# Patient Record
Sex: FEMALE | Race: ASIAN | NOT HISPANIC OR LATINO | Employment: UNEMPLOYED | URBAN - METROPOLITAN AREA
[De-identification: names, ages, dates, MRNs, and addresses within clinical notes are randomized per-mention and may not be internally consistent; named-entity substitution may affect disease eponyms.]

---

## 2017-04-04 ENCOUNTER — HOSPITAL ENCOUNTER (OUTPATIENT)
Dept: LAB | Facility: MEDICAL CENTER | Age: 32
End: 2017-04-04
Attending: OBSTETRICS & GYNECOLOGY
Payer: COMMERCIAL

## 2017-04-04 PROCEDURE — 87491 CHLMYD TRACH DNA AMP PROBE: CPT

## 2017-04-04 PROCEDURE — 87591 N.GONORRHOEAE DNA AMP PROB: CPT

## 2017-04-04 PROCEDURE — 88175 CYTOPATH C/V AUTO FLUID REDO: CPT

## 2017-04-06 LAB
C TRACH DNA GENITAL QL NAA+PROBE: NEGATIVE
CYTOLOGY REG CYTOL: NORMAL
N GONORRHOEA DNA GENITAL QL NAA+PROBE: NEGATIVE
SPECIMEN SOURCE: NORMAL

## 2017-04-20 ENCOUNTER — HOSPITAL ENCOUNTER (OUTPATIENT)
Dept: LAB | Facility: MEDICAL CENTER | Age: 32
End: 2017-04-20
Attending: OBSTETRICS & GYNECOLOGY
Payer: COMMERCIAL

## 2017-04-20 LAB
ABO GROUP BLD: NORMAL
APPEARANCE UR: ABNORMAL
BACTERIA #/AREA URNS HPF: ABNORMAL /HPF
BASOPHILS # BLD AUTO: 0.7 % (ref 0–1.8)
BASOPHILS # BLD: 0.04 K/UL (ref 0–0.12)
BILIRUB UR QL STRIP.AUTO: NEGATIVE
BLD GP AB SCN SERPL QL: NORMAL
CAOX CRY #/AREA URNS HPF: ABNORMAL /HPF
COLOR UR: YELLOW
CULTURE IF INDICATED INDCX: YES UA CULTURE
EOSINOPHIL # BLD AUTO: 0.03 K/UL (ref 0–0.51)
EOSINOPHIL NFR BLD: 0.5 % (ref 0–6.9)
EPI CELLS #/AREA URNS HPF: ABNORMAL /HPF
ERYTHROCYTE [DISTWIDTH] IN BLOOD BY AUTOMATED COUNT: 44.6 FL (ref 35.9–50)
GLUCOSE UR STRIP.AUTO-MCNC: NEGATIVE MG/DL
HBV SURFACE AG SER QL: NEGATIVE
HCT VFR BLD AUTO: 33.8 % (ref 37–47)
HCV AB SER QL: NEGATIVE
HGB BLD-MCNC: 11.6 G/DL (ref 12–16)
HIV 1+2 AB+HIV1 P24 AG SERPL QL IA: NON REACTIVE
IMM GRANULOCYTES # BLD AUTO: 0.01 K/UL (ref 0–0.11)
IMM GRANULOCYTES NFR BLD AUTO: 0.2 % (ref 0–0.9)
KETONES UR STRIP.AUTO-MCNC: NEGATIVE MG/DL
LEUKOCYTE ESTERASE UR QL STRIP.AUTO: ABNORMAL
LYMPHOCYTES # BLD AUTO: 1.04 K/UL (ref 1–4.8)
LYMPHOCYTES NFR BLD: 18.2 % (ref 22–41)
MCH RBC QN AUTO: 31.5 PG (ref 27–33)
MCHC RBC AUTO-ENTMCNC: 34.3 G/DL (ref 33.6–35)
MCV RBC AUTO: 91.8 FL (ref 81.4–97.8)
MICRO URNS: ABNORMAL
MONOCYTES # BLD AUTO: 0.21 K/UL (ref 0–0.85)
MONOCYTES NFR BLD AUTO: 3.7 % (ref 0–13.4)
MUCOUS THREADS #/AREA URNS HPF: ABNORMAL /HPF
NEUTROPHILS # BLD AUTO: 4.4 K/UL (ref 2–7.15)
NEUTROPHILS NFR BLD: 76.7 % (ref 44–72)
NITRITE UR QL STRIP.AUTO: NEGATIVE
NRBC # BLD AUTO: 0 K/UL
NRBC BLD AUTO-RTO: 0 /100 WBC
PH UR STRIP.AUTO: 6.5 [PH]
PLATELET # BLD AUTO: 286 K/UL (ref 164–446)
PMV BLD AUTO: 9.8 FL (ref 9–12.9)
PROT UR QL STRIP: 30 MG/DL
RBC # BLD AUTO: 3.68 M/UL (ref 4.2–5.4)
RBC # URNS HPF: ABNORMAL /HPF
RBC UR QL AUTO: NEGATIVE
RH BLD: NORMAL
RUBV AB SER QL: 108.8 IU/ML
SP GR UR STRIP.AUTO: 1.03
TREPONEMA PALLIDUM IGG+IGM AB [PRESENCE] IN SERUM OR PLASMA BY IMMUNOASSAY: NON REACTIVE
WBC # BLD AUTO: 5.7 K/UL (ref 4.8–10.8)
WBC #/AREA URNS HPF: ABNORMAL /HPF

## 2017-04-20 PROCEDURE — 81511 FTL CGEN ABNOR FOUR ANAL: CPT

## 2017-04-20 PROCEDURE — 86901 BLOOD TYPING SEROLOGIC RH(D): CPT

## 2017-04-20 PROCEDURE — 87340 HEPATITIS B SURFACE AG IA: CPT

## 2017-04-20 PROCEDURE — 86803 HEPATITIS C AB TEST: CPT

## 2017-04-20 PROCEDURE — 87086 URINE CULTURE/COLONY COUNT: CPT

## 2017-04-20 PROCEDURE — 86900 BLOOD TYPING SEROLOGIC ABO: CPT

## 2017-04-20 PROCEDURE — 85025 COMPLETE CBC W/AUTO DIFF WBC: CPT

## 2017-04-20 PROCEDURE — 86762 RUBELLA ANTIBODY: CPT

## 2017-04-20 PROCEDURE — 86780 TREPONEMA PALLIDUM: CPT

## 2017-04-20 PROCEDURE — 86850 RBC ANTIBODY SCREEN: CPT

## 2017-04-20 PROCEDURE — 87389 HIV-1 AG W/HIV-1&-2 AB AG IA: CPT

## 2017-04-20 PROCEDURE — 81001 URINALYSIS AUTO W/SCOPE: CPT

## 2017-04-22 LAB
BACTERIA UR CULT: NORMAL
SIGNIFICANT IND 70042: NORMAL
SOURCE SOURCE: NORMAL

## 2017-04-27 LAB
# FETUSES US: ABNORMAL
AFP MOM SERPL: 0.93
AFP SERPL-MCNC: 28 NG/ML
AGE - REPORTED: 33 YR
GA METHOD: ABNORMAL
GA: 15.14 WEEKS
HCG MOM SERPL: 1.6
HCG SERPL-ACNC: ABNORMAL IU/L
IDDM PATIENT QL: NO
INHIBIN A MOM SERPL: 1.16
INHIBIN A SERPL-MCNC: 225 PG/ML
INTEGRATED SCN PATIENT-IMP: ABNORMAL
PATHOLOGY STUDY: ABNORMAL
U ESTRIOL MOM SERPL: 0.7
U ESTRIOL SERPL-MCNC: 0.49 NG/ML

## 2017-07-05 ENCOUNTER — HOSPITAL ENCOUNTER (OUTPATIENT)
Dept: LAB | Facility: MEDICAL CENTER | Age: 32
End: 2017-07-05
Attending: OBSTETRICS & GYNECOLOGY
Payer: COMMERCIAL

## 2017-07-05 LAB
GLUCOSE 1H P 50 G GLC PO SERPL-MCNC: 91 MG/DL (ref 70–139)
HCT VFR BLD AUTO: 33.6 % (ref 37–47)
HGB BLD-MCNC: 11.4 G/DL (ref 12–16)
PLATELET # BLD AUTO: 311 K/UL (ref 164–446)
TREPONEMA PALLIDUM IGG+IGM AB [PRESENCE] IN SERUM OR PLASMA BY IMMUNOASSAY: NON REACTIVE

## 2017-07-05 PROCEDURE — 36415 COLL VENOUS BLD VENIPUNCTURE: CPT

## 2017-07-05 PROCEDURE — 82950 GLUCOSE TEST: CPT

## 2017-07-05 PROCEDURE — 85049 AUTOMATED PLATELET COUNT: CPT

## 2017-07-05 PROCEDURE — 86780 TREPONEMA PALLIDUM: CPT

## 2017-07-05 PROCEDURE — 85018 HEMOGLOBIN: CPT

## 2017-07-05 PROCEDURE — 85014 HEMATOCRIT: CPT

## 2017-08-30 ENCOUNTER — HOSPITAL ENCOUNTER (OUTPATIENT)
Dept: LAB | Facility: MEDICAL CENTER | Age: 32
End: 2017-08-30
Attending: OBSTETRICS & GYNECOLOGY
Payer: COMMERCIAL

## 2017-08-30 PROCEDURE — 87653 STREP B DNA AMP PROBE: CPT

## 2017-09-01 LAB — GP B STREP DNA SPEC QL NAA+PROBE: POSITIVE

## 2017-09-21 ENCOUNTER — HOSPITAL ENCOUNTER (OUTPATIENT)
Dept: LAB | Facility: MEDICAL CENTER | Age: 32
End: 2017-09-21
Attending: OBSTETRICS & GYNECOLOGY
Payer: COMMERCIAL

## 2017-09-21 PROCEDURE — 87081 CULTURE SCREEN ONLY: CPT

## 2017-09-21 PROCEDURE — 87880 STREP A ASSAY W/OPTIC: CPT

## 2017-09-24 LAB
S PYO SPEC QL CULT: NORMAL
SIGNIFICANT IND 70042: NORMAL
SITE SITE: NORMAL
SOURCE SOURCE: NORMAL

## 2017-10-14 ENCOUNTER — HOSPITAL ENCOUNTER (INPATIENT)
Facility: MEDICAL CENTER | Age: 32
LOS: 2 days | End: 2017-10-16
Attending: OBSTETRICS & GYNECOLOGY | Admitting: OBSTETRICS & GYNECOLOGY
Payer: COMMERCIAL

## 2017-10-14 LAB
ALBUMIN SERPL BCP-MCNC: 3.7 G/DL (ref 3.2–4.9)
ALBUMIN/GLOB SERPL: 1.1 G/DL
ALP SERPL-CCNC: 170 U/L (ref 30–99)
ALT SERPL-CCNC: 14 U/L (ref 2–50)
ANION GAP SERPL CALC-SCNC: 14 MMOL/L (ref 0–11.9)
AST SERPL-CCNC: 21 U/L (ref 12–45)
BASOPHILS # BLD AUTO: 0.6 % (ref 0–1.8)
BASOPHILS # BLD: 0.05 K/UL (ref 0–0.12)
BILIRUB SERPL-MCNC: 0.8 MG/DL (ref 0.1–1.5)
BUN SERPL-MCNC: 9 MG/DL (ref 8–22)
CALCIUM SERPL-MCNC: 9.6 MG/DL (ref 8.5–10.5)
CHLORIDE SERPL-SCNC: 106 MMOL/L (ref 96–112)
CO2 SERPL-SCNC: 19 MMOL/L (ref 20–33)
CREAT SERPL-MCNC: 0.51 MG/DL (ref 0.5–1.4)
EOSINOPHIL # BLD AUTO: 0.03 K/UL (ref 0–0.51)
EOSINOPHIL NFR BLD: 0.4 % (ref 0–6.9)
ERYTHROCYTE [DISTWIDTH] IN BLOOD BY AUTOMATED COUNT: 43.3 FL (ref 35.9–50)
ERYTHROCYTE [DISTWIDTH] IN BLOOD BY AUTOMATED COUNT: 43.4 FL (ref 35.9–50)
GFR SERPL CREATININE-BSD FRML MDRD: >60 ML/MIN/1.73 M 2
GLOBULIN SER CALC-MCNC: 3.3 G/DL (ref 1.9–3.5)
GLUCOSE SERPL-MCNC: 111 MG/DL (ref 65–99)
HCT VFR BLD AUTO: 30.9 % (ref 37–47)
HCT VFR BLD AUTO: 37.5 % (ref 37–47)
HGB BLD-MCNC: 10.7 G/DL (ref 12–16)
HGB BLD-MCNC: 13 G/DL (ref 12–16)
HOLDING TUBE BB 8507: NORMAL
IMM GRANULOCYTES # BLD AUTO: 0.1 K/UL (ref 0–0.11)
IMM GRANULOCYTES NFR BLD AUTO: 1.2 % (ref 0–0.9)
LYMPHOCYTES # BLD AUTO: 1.35 K/UL (ref 1–4.8)
LYMPHOCYTES NFR BLD: 15.8 % (ref 22–41)
MCH RBC QN AUTO: 31.4 PG (ref 27–33)
MCH RBC QN AUTO: 31.7 PG (ref 27–33)
MCHC RBC AUTO-ENTMCNC: 34.6 G/DL (ref 33.6–35)
MCHC RBC AUTO-ENTMCNC: 34.7 G/DL (ref 33.6–35)
MCV RBC AUTO: 90.6 FL (ref 81.4–97.8)
MCV RBC AUTO: 91.4 FL (ref 81.4–97.8)
MONOCYTES # BLD AUTO: 0.39 K/UL (ref 0–0.85)
MONOCYTES NFR BLD AUTO: 4.6 % (ref 0–13.4)
NEUTROPHILS # BLD AUTO: 6.61 K/UL (ref 2–7.15)
NEUTROPHILS NFR BLD: 77.4 % (ref 44–72)
NRBC # BLD AUTO: 0 K/UL
NRBC BLD AUTO-RTO: 0 /100 WBC
PLATELET # BLD AUTO: 244 K/UL (ref 164–446)
PLATELET # BLD AUTO: 283 K/UL (ref 164–446)
PMV BLD AUTO: 9.3 FL (ref 9–12.9)
PMV BLD AUTO: 9.5 FL (ref 9–12.9)
POTASSIUM SERPL-SCNC: 3.7 MMOL/L (ref 3.6–5.5)
PROT SERPL-MCNC: 7 G/DL (ref 6–8.2)
RBC # BLD AUTO: 3.38 M/UL (ref 4.2–5.4)
RBC # BLD AUTO: 4.14 M/UL (ref 4.2–5.4)
SODIUM SERPL-SCNC: 139 MMOL/L (ref 135–145)
URATE SERPL-MCNC: 5.6 MG/DL (ref 1.9–8.2)
WBC # BLD AUTO: 11.2 K/UL (ref 4.8–10.8)
WBC # BLD AUTO: 8.5 K/UL (ref 4.8–10.8)

## 2017-10-14 PROCEDURE — 10907ZC DRAINAGE OF AMNIOTIC FLUID, THERAPEUTIC FROM PRODUCTS OF CONCEPTION, VIA NATURAL OR ARTIFICIAL OPENING: ICD-10-PCS | Performed by: OBSTETRICS & GYNECOLOGY

## 2017-10-14 PROCEDURE — 770002 HCHG ROOM/CARE - OB PRIVATE (112)

## 2017-10-14 PROCEDURE — 303615 HCHG EPIDURAL/SPINAL ANESTHESIA FOR LABOR

## 2017-10-14 PROCEDURE — 700105 HCHG RX REV CODE 258: Performed by: OBSTETRICS & GYNECOLOGY

## 2017-10-14 PROCEDURE — 4A1HXCZ MONITORING OF PRODUCTS OF CONCEPTION, CARDIAC RATE, EXTERNAL APPROACH: ICD-10-PCS | Performed by: OBSTETRICS & GYNECOLOGY

## 2017-10-14 PROCEDURE — 700111 HCHG RX REV CODE 636 W/ 250 OVERRIDE (IP)

## 2017-10-14 PROCEDURE — 700105 HCHG RX REV CODE 258

## 2017-10-14 PROCEDURE — 85025 COMPLETE CBC W/AUTO DIFF WBC: CPT

## 2017-10-14 PROCEDURE — 84550 ASSAY OF BLOOD/URIC ACID: CPT

## 2017-10-14 PROCEDURE — 0HQ9XZZ REPAIR PERINEUM SKIN, EXTERNAL APPROACH: ICD-10-PCS | Performed by: OBSTETRICS & GYNECOLOGY

## 2017-10-14 PROCEDURE — A9270 NON-COVERED ITEM OR SERVICE: HCPCS | Performed by: OBSTETRICS & GYNECOLOGY

## 2017-10-14 PROCEDURE — 304965 HCHG RECOVERY SERVICES

## 2017-10-14 PROCEDURE — 700111 HCHG RX REV CODE 636 W/ 250 OVERRIDE (IP): Performed by: OBSTETRICS & GYNECOLOGY

## 2017-10-14 PROCEDURE — 36415 COLL VENOUS BLD VENIPUNCTURE: CPT

## 2017-10-14 PROCEDURE — 85027 COMPLETE CBC AUTOMATED: CPT

## 2017-10-14 PROCEDURE — 80053 COMPREHEN METABOLIC PANEL: CPT

## 2017-10-14 PROCEDURE — 700102 HCHG RX REV CODE 250 W/ 637 OVERRIDE(OP): Performed by: OBSTETRICS & GYNECOLOGY

## 2017-10-14 PROCEDURE — 59409 OBSTETRICAL CARE: CPT

## 2017-10-14 RX ORDER — AMPICILLIN 2 G/1
2 INJECTION, POWDER, FOR SOLUTION INTRAVENOUS ONCE
Status: COMPLETED | OUTPATIENT
Start: 2017-10-14 | End: 2017-10-14

## 2017-10-14 RX ORDER — BUPIVACAINE HYDROCHLORIDE 2.5 MG/ML
INJECTION, SOLUTION EPIDURAL; INFILTRATION; INTRACAUDAL
Status: DISCONTINUED
Start: 2017-10-14 | End: 2017-10-14

## 2017-10-14 RX ORDER — ALUMINA, MAGNESIA, AND SIMETHICONE 2400; 2400; 240 MG/30ML; MG/30ML; MG/30ML
30 SUSPENSION ORAL EVERY 6 HOURS PRN
Status: DISCONTINUED | OUTPATIENT
Start: 2017-10-14 | End: 2017-10-14 | Stop reason: HOSPADM

## 2017-10-14 RX ORDER — DOCUSATE SODIUM 100 MG/1
100 CAPSULE, LIQUID FILLED ORAL 2 TIMES DAILY PRN
Status: DISCONTINUED | OUTPATIENT
Start: 2017-10-14 | End: 2017-10-16 | Stop reason: HOSPADM

## 2017-10-14 RX ORDER — CARBOPROST TROMETHAMINE 250 UG/ML
250 INJECTION, SOLUTION INTRAMUSCULAR
Status: DISCONTINUED | OUTPATIENT
Start: 2017-10-14 | End: 2017-10-16 | Stop reason: HOSPADM

## 2017-10-14 RX ORDER — ROPIVACAINE HYDROCHLORIDE 2 MG/ML
INJECTION, SOLUTION EPIDURAL; INFILTRATION; PERINEURAL
Status: COMPLETED
Start: 2017-10-14 | End: 2017-10-14

## 2017-10-14 RX ORDER — OXYCODONE AND ACETAMINOPHEN 10; 325 MG/1; MG/1
1 TABLET ORAL EVERY 4 HOURS PRN
Status: DISCONTINUED | OUTPATIENT
Start: 2017-10-14 | End: 2017-10-16 | Stop reason: HOSPADM

## 2017-10-14 RX ORDER — OXYCODONE HYDROCHLORIDE AND ACETAMINOPHEN 5; 325 MG/1; MG/1
1 TABLET ORAL EVERY 4 HOURS PRN
Status: DISCONTINUED | OUTPATIENT
Start: 2017-10-14 | End: 2017-10-16 | Stop reason: HOSPADM

## 2017-10-14 RX ORDER — VITAMIN A ACETATE, BETA CAROTENE, ASCORBIC ACID, CHOLECALCIFEROL, .ALPHA.-TOCOPHEROL ACETATE, DL-, THIAMINE MONONITRATE, RIBOFLAVIN, NIACINAMIDE, PYRIDOXINE HYDROCHLORIDE, FOLIC ACID, CYANOCOBALAMIN, CALCIUM CARBONATE, FERROUS FUMARATE, ZINC OXIDE, CUPRIC OXIDE 3080; 12; 120; 400; 1; 1.84; 3; 20; 22; 920; 25; 200; 27; 10; 2 [IU]/1; UG/1; MG/1; [IU]/1; MG/1; MG/1; MG/1; MG/1; MG/1; [IU]/1; MG/1; MG/1; MG/1; MG/1; MG/1
1 TABLET, FILM COATED ORAL EVERY MORNING
Status: DISCONTINUED | OUTPATIENT
Start: 2017-10-14 | End: 2017-10-16 | Stop reason: HOSPADM

## 2017-10-14 RX ORDER — DEXTROSE, SODIUM CHLORIDE, SODIUM LACTATE, POTASSIUM CHLORIDE, AND CALCIUM CHLORIDE 5; .6; .31; .03; .02 G/100ML; G/100ML; G/100ML; G/100ML; G/100ML
INJECTION, SOLUTION INTRAVENOUS CONTINUOUS
Status: DISCONTINUED | OUTPATIENT
Start: 2017-10-14 | End: 2017-10-14 | Stop reason: HOSPADM

## 2017-10-14 RX ORDER — SODIUM CHLORIDE, SODIUM LACTATE, POTASSIUM CHLORIDE, CALCIUM CHLORIDE 600; 310; 30; 20 MG/100ML; MG/100ML; MG/100ML; MG/100ML
INJECTION, SOLUTION INTRAVENOUS
Status: COMPLETED
Start: 2017-10-14 | End: 2017-10-14

## 2017-10-14 RX ORDER — HYDROCODONE BITARTRATE AND ACETAMINOPHEN 5; 325 MG/1; MG/1
1-2 TABLET ORAL EVERY 6 HOURS PRN
Status: DISCONTINUED | OUTPATIENT
Start: 2017-10-14 | End: 2017-10-16 | Stop reason: HOSPADM

## 2017-10-14 RX ORDER — MISOPROSTOL 200 UG/1
800 TABLET ORAL
Status: DISCONTINUED | OUTPATIENT
Start: 2017-10-14 | End: 2017-10-16 | Stop reason: HOSPADM

## 2017-10-14 RX ORDER — IBUPROFEN 600 MG/1
600 TABLET ORAL EVERY 6 HOURS PRN
Status: DISCONTINUED | OUTPATIENT
Start: 2017-10-14 | End: 2017-10-14

## 2017-10-14 RX ORDER — IBUPROFEN 600 MG/1
600 TABLET ORAL EVERY 6 HOURS PRN
Status: DISCONTINUED | OUTPATIENT
Start: 2017-10-14 | End: 2017-10-16 | Stop reason: HOSPADM

## 2017-10-14 RX ORDER — MISOPROSTOL 200 UG/1
1000 TABLET ORAL
Status: DISCONTINUED | OUTPATIENT
Start: 2017-10-14 | End: 2017-10-14 | Stop reason: HOSPADM

## 2017-10-14 RX ORDER — SODIUM CHLORIDE, SODIUM LACTATE, POTASSIUM CHLORIDE, CALCIUM CHLORIDE 600; 310; 30; 20 MG/100ML; MG/100ML; MG/100ML; MG/100ML
INJECTION, SOLUTION INTRAVENOUS PRN
Status: DISCONTINUED | OUTPATIENT
Start: 2017-10-14 | End: 2017-10-16 | Stop reason: HOSPADM

## 2017-10-14 RX ORDER — SODIUM CHLORIDE 9 MG/ML
INJECTION, SOLUTION INTRAVENOUS
Status: COMPLETED
Start: 2017-10-14 | End: 2017-10-14

## 2017-10-14 RX ORDER — METHYLERGONOVINE MALEATE 0.2 MG/ML
0.2 INJECTION INTRAVENOUS
Status: DISCONTINUED | OUTPATIENT
Start: 2017-10-14 | End: 2017-10-16 | Stop reason: HOSPADM

## 2017-10-14 RX ORDER — ONDANSETRON 4 MG/1
4 TABLET, ORALLY DISINTEGRATING ORAL EVERY 6 HOURS PRN
Status: DISCONTINUED | OUTPATIENT
Start: 2017-10-14 | End: 2017-10-16 | Stop reason: HOSPADM

## 2017-10-14 RX ORDER — AMPICILLIN 1 G/1
1 INJECTION, POWDER, FOR SOLUTION INTRAMUSCULAR; INTRAVENOUS EVERY 4 HOURS
Status: DISCONTINUED | OUTPATIENT
Start: 2017-10-14 | End: 2017-10-14 | Stop reason: HOSPADM

## 2017-10-14 RX ORDER — SODIUM CHLORIDE, SODIUM LACTATE, POTASSIUM CHLORIDE, CALCIUM CHLORIDE 600; 310; 30; 20 MG/100ML; MG/100ML; MG/100ML; MG/100ML
INJECTION, SOLUTION INTRAVENOUS CONTINUOUS
Status: DISCONTINUED | OUTPATIENT
Start: 2017-10-14 | End: 2017-10-14

## 2017-10-14 RX ORDER — ONDANSETRON 2 MG/ML
4 INJECTION INTRAMUSCULAR; INTRAVENOUS EVERY 6 HOURS PRN
Status: DISCONTINUED | OUTPATIENT
Start: 2017-10-14 | End: 2017-10-16 | Stop reason: HOSPADM

## 2017-10-14 RX ADMIN — SODIUM CHLORIDE: 900 INJECTION INTRAVENOUS at 09:30

## 2017-10-14 RX ADMIN — OXYTOCIN 20 UNITS: 10 INJECTION, SOLUTION INTRAMUSCULAR; INTRAVENOUS at 13:00

## 2017-10-14 RX ADMIN — SODIUM CHLORIDE, POTASSIUM CHLORIDE, SODIUM LACTATE AND CALCIUM CHLORIDE: 600; 310; 30; 20 INJECTION, SOLUTION INTRAVENOUS at 06:08

## 2017-10-14 RX ADMIN — IBUPROFEN 600 MG: 600 TABLET, FILM COATED ORAL at 17:13

## 2017-10-14 RX ADMIN — IBUPROFEN 600 MG: 600 TABLET, FILM COATED ORAL at 23:30

## 2017-10-14 RX ADMIN — AMPICILLIN SODIUM 1 G: 1 INJECTION, POWDER, FOR SOLUTION INTRAMUSCULAR; INTRAVENOUS at 09:29

## 2017-10-14 RX ADMIN — AMPICILLIN SODIUM 2 G: 2 INJECTION, POWDER, FOR SOLUTION INTRAMUSCULAR; INTRAVENOUS at 06:05

## 2017-10-14 RX ADMIN — OXYTOCIN 20 UNITS: 10 INJECTION, SOLUTION INTRAMUSCULAR; INTRAVENOUS at 12:00

## 2017-10-14 RX ADMIN — SODIUM CHLORIDE, POTASSIUM CHLORIDE, SODIUM LACTATE AND CALCIUM CHLORIDE: 600; 310; 30; 20 INJECTION, SOLUTION INTRAVENOUS at 11:12

## 2017-10-14 RX ADMIN — ROPIVACAINE HYDROCHLORIDE 100 ML: 2 INJECTION, SOLUTION EPIDURAL; INFILTRATION at 07:04

## 2017-10-14 ASSESSMENT — PATIENT HEALTH QUESTIONNAIRE - PHQ9
SUM OF ALL RESPONSES TO PHQ9 QUESTIONS 1 AND 2: 0
2. FEELING DOWN, DEPRESSED, IRRITABLE, OR HOPELESS: NOT AT ALL
1. LITTLE INTEREST OR PLEASURE IN DOING THINGS: NOT AT ALL
SUM OF ALL RESPONSES TO PHQ QUESTIONS 1-9: 0

## 2017-10-14 ASSESSMENT — COPD QUESTIONNAIRES
HAVE YOU SMOKED AT LEAST 100 CIGARETTES IN YOUR ENTIRE LIFE: NO/DON'T KNOW
COPD SCREENING SCORE: 0
DURING THE PAST 4 WEEKS HOW MUCH DID YOU FEEL SHORT OF BREATH: NONE/LITTLE OF THE TIME
DO YOU EVER COUGH UP ANY MUCUS OR PHLEGM?: NO/ONLY WITH OCCASIONAL COLDS OR INFECTIONS

## 2017-10-14 ASSESSMENT — LIFESTYLE VARIABLES
EVER_SMOKED: NEVER
DO YOU DRINK ALCOHOL: NO
ALCOHOL_USE: NO

## 2017-10-14 ASSESSMENT — PAIN SCALES - GENERAL
PAINLEVEL_OUTOF10: 1
PAINLEVEL_OUTOF10: 2
PAINLEVEL_OUTOF10: 0
PAINLEVEL_OUTOF10: 1
PAINLEVEL_OUTOF10: 1

## 2017-10-14 NOTE — PROGRESS NOTES
1300- report received from CHARLA Fisher RN, accepted care of pt. Pt denies pain at this time. Call light in reach. RN stressed importance of calling for help when getting OOB. Pt verbalized understanding.   1430- Pt up to bathroom with x1 person assist, void large amount without difficulty. Pt felt faint before ambulating to wheelchair. Educated pt to call for assistance when getting OOB. Pt transferred to  in stable condition with all her personal possessions.   1500- report to CATALINO Osorio RN. Baby bands verified.

## 2017-10-14 NOTE — PROGRESS NOTES
0530- pt presents to L&D with c/o contractions. Pt states she has been having contractions since 0300 today. Pt reports good fetal movement, denies vaginal bleeding and leaking of fluid. SVE by RN 6/80-1. Report to Dr Shah, orders received for admission. Pt requesting epidural for pain management.  1314- Dr Cr in room to place epidural. Pt tolerated procedure well, no complications with test dose.

## 2017-10-14 NOTE — CARE PLAN
Problem: Infection  Goal: Will remain free from infection  Outcome: PROGRESSING AS EXPECTED  Pt remains free of signs/symptoms of infection at this time, will continue to assess    Problem: Pain  Goal: Alleviation of Pain or a reduction in pain to the patient's comfort goal  Outcome: PROGRESSING AS EXPECTED  Pt states pain is well controlled with epidural, will continue to assess pain level

## 2017-10-14 NOTE — PROGRESS NOTES
1000- Dr Lewis at bedside. SVE- lip/100/0. AROM  By MD- thick meconium.  1140- pt complete/+2. Pt set up for delivery, begins pushing.  1150- Dr Lewis at bedside  1157-  of viable female infant. 8&9 apgar scores assigned

## 2017-10-14 NOTE — DELIVERY NOTE
DATE OF SERVICE:  10/14/2017    DELIVERY NOTE:  This is a 32-year-old  2, para 1, at 40-3/7 weeks, who   presented to labor and delivery this morning with contractions.  She was 6 cm   dilated and was admitted to labor and delivery.  She received an epidural for   pain management.  She was known to be group B strep positive and was started   on ampicillin.  She received 2 doses of ampicillin prior to artificial rupture   of membranes, which yielded thick meconium-stained fluid.  AROM occurred when   the patient was approximately 9 cm dilated.  Throughout labor, fetal heart   tones were in the 140s and a category 1 tracing.  The patient progressed to   complete and pushed over 4 contractions to deliver the head over an intact   perineum.  Loose nuchal cord x1 was easily reduced and a compound presentation   with the left arm was encountered, but delivered easily.  This was a viable   female infant, weight 6 pounds 10 ounces, Apgars 8 and 9, delivered at 1157   hours.  The cord was cut immediately and the infant was handed off to the   waiting respiratory therapist.  Unfortunately, she was already crying   vigorously, so there was no ability to suction below the vocal cords; however,   there was some bulb suction and suction of the abdomen was a minimal amount   of meconium-stained fluid.  The placenta delivered spontaneously intact with a   3-vessel cord at 1205 hours, 20 units of IV Pitocin, fundal massage and   bimanual massage provided good uterine contraction.  Estimated blood loss was   200 mL.  Inspection of the cervix revealed no lacerations.  Inspection of the   perineum revealed a small first-degree laceration.  This was repaired with 2-0   Vicryl in the usual sterile fashion.  Mother and infant are stable.       ____________________________________     MD SANDY DE PAZ / JESU    DD:  10/14/2017 12:20:41  DT:  10/14/2017 12:45:38    D#:  [id number]  Job#:  [id number]    cc: GUANACO SLATER MD

## 2017-10-14 NOTE — PROGRESS NOTES
Patient arrived via wheelchair with belongings to S339. Report received from Aracelis MCLEOD RN. Pt oriented to room, call light/skylight & infant security. Assessment done fundus firm, lochia light, and vital signs within defined parameters. IV patent and infusing pitocin at 125 mL/Hr per MD order. Reviewed plan of care with patient, patient verbalized understanding, encouraged to call with needs. Call light within reach, hourly rounding implemented.

## 2017-10-14 NOTE — CARE PLAN
Problem: Altered physiologic condition related to immediate post-delivery state and potential for bleeding/hemorrhage  Goal: Patient physiologically stable as evidenced by normal lochia, palpable uterine involution and vital signs within normal limits  Outcome: PROGRESSING AS EXPECTED  Patient's fundus is firm, lochia light and vital signs within defined limits. Patient educated on appropriate amounts of bleeding and when to notify RN/MD, patient verbalized understanding.     Problem: Potential for postpartum infection related to presence of episiotomy/vaginal tear and/or uterine contamination  Goal: Patient will be absent from signs and symptoms of infection  Outcome: PROGRESSING AS EXPECTED  Patient has no signs/symptoms of infection, afebrile. Patient educated about signs/symptoms of infection and when to notify MD, patient verbalized understanding.

## 2017-10-14 NOTE — H&P
DATE OF ADMISSION:  10/14/2017    ADMITTING DIAGNOSES:  1.  Intrauterine pregnancy at 40-3/7 weeks.  2.  Labor.  3.  Group B strep positive.    HISTORY OF PRESENT ILLNESS:  This is a 32-year-old  2, para 1 with an   estimated date of confinement of 10/11/2017 who presented to labor and   delivery this morning with complaints of contractions.  She states that the   contractions started at 0300 hours.  She denied any vaginal bleeding or loss   of fluid.  She reported good fetal movement.  On exam, she was found to be 6   cm dilated, 80% effaced, -1 station, vertex presentation per the nurse's exam.    The patient was admitted to labor and delivery and requested and received an   epidural for pain management.    Prenatal care has been with Dr. Jack, her estimated date of confinement   of 10/11/2017 was established by last menstrual period consistent with a   12-week ultrasound.    PRENATAL LABS:  Her blood type is AB positive, antibody screen negative, RPR   nonreactive, rubella immune, hepatitis B surface antigen negative, hepatitis C   negative, HIV negative.  Her 1-hour Glucola was normal.  Group B strep status   is positive.  She had a multiple marker screen performed, which was low risk.    COMPLICATIONS WITH THE PREGNANCY:  Include poor maternal weight gain.  She has   fundal placenta with no previa.  Total weight gain for the pregnancy has been   11 pounds.    ALLERGIES:  She has no known drug allergies.  SHE IS ALLERGIC TO TAPE AND   LATEX.    MEDICATIONS:  Include prenatal vitamins.    PAST MEDICAL HISTORY:  Significant for asthma.    PAST SURGICAL HISTORY:  Significant for cosmetic ear reconstruction.    SOCIAL HISTORY:  She denies tobacco, alcohol or IV drug use.    GYNECOLOGIC HISTORY:  Unremarkable.    OBSTETRICAL HISTORY:  She has had 1 normal spontaneous vaginal delivery, 7   pounds 10 ounces without complication.   2 is her current pregnancy.    PHYSICAL EXAMINATION:  VITAL SIGNS:   Pulse is 85, temperature is 98.1, blood pressure is 113/66.  GENERAL:  She is now pleasant with no apparent distress.  ABDOMEN:  Gravid and nontender.  EXTREMITIES:  Without clubbing, cyanosis or edema.  PELVIS:  Fetal heart tones are in the 150s and a category 1 tracing.    Tocometry shows contractions every 3 minutes.  Cervical exam on admission 6 cm   dilated, 80% effaced, 0 station, vertex presentation per the nurse.    Estimated fetal weight is 3100 g.    IMPRESSION:  This is a 32-year-old  2, para 1, at 40-3/7 weeks in   active labor and group B strep positive.    PLAN:  1.  Patient will be admitted to labor and delivery.  2.  IV fluids will be started.  3.  She will be started on ampicillin for group B strep positive status.  4.  An epidural has been placed.  5.  Plan for artificial rupture of membranes after her second dose of   antibiotics.  6.  There is currently reassuring fetal surveillance.       ____________________________________     MD SANDY DE PAZ / JESU    DD:  10/14/2017 09:36:11  DT:  10/14/2017 09:59:05    D#:  [id number]  Job#:  [id number]    cc: GUANACO SLATER MD

## 2017-10-14 NOTE — PROGRESS NOTES
S:  Pt comfortable wit epidural.  Discussed AROM - pt agrees    O:  VSS - afebrile  FHTs 140s Cat 1-2  [place]: Q 3  Cx: Ant lip/100/0 AROM thick mec    A/P:  IUP at 40 3/7 weeks, labor, GBS + , Mec - doing well  1.  Labor:  Pt progressing spontaneously. Recheck in 30 min and start second stage if complete  2. FWB: Overall reassuring. RT for delivery secondary to Mec  3.  GBS +  Received second dose of Abx

## 2017-10-14 NOTE — H&P
"  History and Physical    Gianna Bojorquez is a 32 y.o. female  -Para:     Gestational Age:  40w3d  Admitted for:   Active  Admitted to  [facility] Delivery.  Patient received prenatal care: Dr. Jack    HPI: Patient is a 33 yo  at 40w3d based upon her stated JULIETTE of 10/11/17 who presents to labor and delivery in active labor.  She states that her labor began at 0300 on 10/14/17.  She denies vaginal bleeding and leakage of fluid.  She reports excellent fetal movement.      Patient denies fever, chills, nausea, vomiting , headache, visual disturbance, or dysuria  No LMP recorded.  Estimated Date of Delivery: 10/11/17    Patient Active Problem List    Diagnosis Date Noted   • Congenital deformity of ear 2015   • History of external ear surgery 2015       Admitting DX: Pregnancy  Labor and delivery, indication for care       History:   has a past medical history of Asthma and Congenital deformity of ear.     has a past surgical history that includes other and ear reconstruction.    OB History    Para Term  AB Living   1 1           SAB TAB Ectopic Molar Multiple Live Births                    # Outcome Date GA Lbr Frank/2nd Weight Sex Delivery Anes PTL Lv   1 Para                   Medications:  No current facility-administered medications on file prior to encounter.      Current Outpatient Prescriptions on File Prior to Encounter   Medication Sig Dispense Refill   • ibuprofen (MOTRIN) 600 MG TABS Take 1 Tab by mouth every 6 hours as needed (Cramping). 30 Tab 1       Allergies:  Review of patient's allergies indicates no known allergies.    ROS:   Neuro: negative  Cardiovascular: negative  Gastro intestinal: negative  Genitourinary: negative            Physical Exam:  Ht 1.676 m (5' 6\")   Wt 67.6 kg (149 lb)   BMI 24.05 kg/m²   Constitutional: A, A, and O x 3 NAD    Vertex    EFW - 3100 grams    SVE (per RN) - 6/80%/0    TOCO - every 3-5 minutes    EFM - category I tracing.  " No decelerations.      Labs:  Recent Labs      10/14/17   0550   WBC  8.5   RBC  4.14*   HEMOGLOBIN  13.0   HEMATOCRIT  37.5   MCV  90.6   MCH  31.4   MCHC  34.7   RDW  43.3   PLATELETCT  283   MPV  9.3     Prenatal Results    The patient does not have an associated pregnancy episode and working JULIETTE. Some results will not display without a pregnancy episode and working JULIETTE.   1st Trimester     Test Value Date Time    ABO AB  17 0838    RH       Antibody       CBC/PLT/DIFF       HGB       Platelets       HGB A1C        1 Hr GCT       3 Hr GTT       Rubella       RPR       Urine Culture       24 Urine Protein        24 Urine Creatinine        HBsAg       Hep CAB        HIV       Gonorrhea       Chlamydia       TSH        Free T4         TB       Pap       SYPHILUS TREP QUAL [id number] [[id number]][             2nd Trimester     Test Value Date Time    HCT       HGB       1 Hr GCT       3 Hr GTT             24-28 Weeks     Test Value Date Time    1 Hr GCT        TSH        Free T4        24 Urine Protein       24 Urine Creatinine       BUN       Creatinine       GFR       AST       ALT       Uric Acid       LDH             3rd Trimester     Test Value Date Time    HCT       HGB       TSH       Free T4       24 Hr Urine Protein       24 Hr Urine Creatinine       SYPHILUS TREP QUAL             35-37 Weeks     Test Value Date Time    GBS PCR LB       GBS PCR             Genetic Screening     Test Value Date Time    Cystic Fibrosis       AFP Quad       Sickle Cell                 GBS positive    Assessment:  Gestational Age:  40w3d  Labor State:   Active labor.       Patient Active Problem List    Diagnosis Date Noted   • Congenital deformity of ear 2015   • History of external ear surgery 2015       Plan:   Admitted for: active labor.  GBS positive - begin Ampicillin for GBS prophylaxis.  Anticipate .  Will AROM after 2nd dose of antibiotics and if no labor progress.  Ok for epidural if desired.  Need a copy  of her prenatal records if available.    Sneha Conroy M.D.

## 2017-10-15 PROCEDURE — 700102 HCHG RX REV CODE 250 W/ 637 OVERRIDE(OP): Performed by: OBSTETRICS & GYNECOLOGY

## 2017-10-15 PROCEDURE — A9270 NON-COVERED ITEM OR SERVICE: HCPCS | Performed by: OBSTETRICS & GYNECOLOGY

## 2017-10-15 PROCEDURE — 770002 HCHG ROOM/CARE - OB PRIVATE (112)

## 2017-10-15 RX ADMIN — IBUPROFEN 600 MG: 600 TABLET, FILM COATED ORAL at 23:44

## 2017-10-15 RX ADMIN — IBUPROFEN 600 MG: 600 TABLET, FILM COATED ORAL at 07:22

## 2017-10-15 RX ADMIN — Medication 1 TABLET: at 07:22

## 2017-10-15 ASSESSMENT — PAIN SCALES - GENERAL
PAINLEVEL_OUTOF10: 0
PAINLEVEL_OUTOF10: 2
PAINLEVEL_OUTOF10: 0

## 2017-10-15 NOTE — PROGRESS NOTES
Lactation note:Per parents request.      Initial visit. WILFRED Ruiz in room. Parents very anxious, and concerned about infant not being able to breathe while breastfeeding.  Observed infant latched onto right side in cradle hold. Color is pink, airway is clear, and infant is swallowing. Discussed signs of distress- what to look for and to check infant color.   Discussed signs of a good latch, and normal course of breastfeeding and what to expect at 12-48 hours. Encouraged frequent skin to skin, and to continue offering breast every 2-3 hours. Breastfeeding essentials pamphlet given, and reviewed.     Parents also thought could get donor milk to supplement. Educated DBM only provided for medical need for infant is necessary.

## 2017-10-15 NOTE — PROGRESS NOTES
Assumed care of patient at 1915, received report from day RN at that time. Communication board updated and reviewed with pt and family. Questions answered. Assessment complete. Pt denies pain.  Call light and personal belongings within reach. No other needs at this time.

## 2017-10-15 NOTE — CARE PLAN
Problem: Alteration in comfort related to episiotomy, vaginal repair and/or after birth pains  Goal: Patient is able to ambulate, care for self and infant  Patient is able to ambulate    Problem: Potential knowledge deficit related to lack of understanding of self and  care  Goal: Patient will verbalize understanding of self and infant care  Patient verbalizes understanding to care for self and infant.

## 2017-10-15 NOTE — CARE PLAN
Problem: Potential knowledge deficit related to lack of understanding of self and  care  Goal: Patient will verbalize understanding of self and infant care    Intervention: Educate patient verbally, by demonstration, and with written and video material  Demonstrated changing infants diaper, showed parents how to swaddle infant.     Goal: Patient will demonstrate ability to care for self and infant  Pt is enthusiastic about breast feeding when infant needs to.     Problem: Potential anxiety related to difficulty adapting to parental role  Goal: Patient will verbalize and demonstrate effective bonding and parenting behavior  Needs encouragement to change infants diaper on their own.

## 2017-10-15 NOTE — PROGRESS NOTES
PP day #1    S:  Pt doing well.  Minimal lochia.  Voiding well. Working on breast feeding.  Wants to stay until tomorrow because of GBS +    O:  T 98.4 P 80  /79  ABD:  Soft, NT, FF below umb  EXT: No cords or Homans  H/H 10/30  GBS +  AB+    A/P:  PP day #1 S/P  at term - doing well  1.  Continue routine pp care  2.  Plan for D/C home tomorrow secondary to GBS +

## 2017-10-16 VITALS
DIASTOLIC BLOOD PRESSURE: 81 MMHG | SYSTOLIC BLOOD PRESSURE: 121 MMHG | WEIGHT: 149 LBS | HEART RATE: 66 BPM | TEMPERATURE: 97.9 F | BODY MASS INDEX: 23.95 KG/M2 | HEIGHT: 66 IN | OXYGEN SATURATION: 96 % | RESPIRATION RATE: 16 BRPM

## 2017-10-16 PROCEDURE — 3E0234Z INTRODUCTION OF SERUM, TOXOID AND VACCINE INTO MUSCLE, PERCUTANEOUS APPROACH: ICD-10-PCS | Performed by: OBSTETRICS & GYNECOLOGY

## 2017-10-16 PROCEDURE — A9270 NON-COVERED ITEM OR SERVICE: HCPCS | Performed by: OBSTETRICS & GYNECOLOGY

## 2017-10-16 PROCEDURE — 700111 HCHG RX REV CODE 636 W/ 250 OVERRIDE (IP): Performed by: OBSTETRICS & GYNECOLOGY

## 2017-10-16 PROCEDURE — 90686 IIV4 VACC NO PRSV 0.5 ML IM: CPT | Performed by: OBSTETRICS & GYNECOLOGY

## 2017-10-16 PROCEDURE — 700112 HCHG RX REV CODE 229: Performed by: OBSTETRICS & GYNECOLOGY

## 2017-10-16 PROCEDURE — 700102 HCHG RX REV CODE 250 W/ 637 OVERRIDE(OP): Performed by: OBSTETRICS & GYNECOLOGY

## 2017-10-16 PROCEDURE — 90471 IMMUNIZATION ADMIN: CPT

## 2017-10-16 RX ORDER — HYDROCODONE BITARTRATE AND ACETAMINOPHEN 5; 325 MG/1; MG/1
1-2 TABLET ORAL EVERY 6 HOURS PRN
Qty: 10 TAB | Refills: 0 | Status: SHIPPED | OUTPATIENT
Start: 2017-10-16 | End: 2017-11-04

## 2017-10-16 RX ORDER — IBUPROFEN 600 MG/1
600 TABLET ORAL EVERY 6 HOURS PRN
Qty: 30 TAB | Refills: 3 | Status: SHIPPED | OUTPATIENT
Start: 2017-10-16 | End: 2017-11-04

## 2017-10-16 RX ADMIN — INFLUENZA A VIRUS A/MICHIGAN/45/2015 X-275 (H1N1) ANTIGEN (FORMALDEHYDE INACTIVATED), INFLUENZA A VIRUS A/HONG KONG/4801/2014 X-263B (H3N2) ANTIGEN (FORMALDEHYDE INACTIVATED), INFLUENZA B VIRUS B/PHUKET/3073/2013 ANTIGEN (FORMALDEHYDE INACTIVATED), AND INFLUENZA B VIRUS B/BRISBANE/60/2008 ANTIGEN (FORMALDEHYDE INACTIVATED) 0.5 ML: 15; 15; 15; 15 INJECTION, SUSPENSION INTRAMUSCULAR at 11:43

## 2017-10-16 RX ADMIN — DOCUSATE SODIUM 100 MG: 100 CAPSULE ORAL at 07:39

## 2017-10-16 RX ADMIN — Medication 1 TABLET: at 07:39

## 2017-10-16 ASSESSMENT — PAIN SCALES - GENERAL
PAINLEVEL_OUTOF10: 0
PAINLEVEL_OUTOF10: 0

## 2017-10-16 ASSESSMENT — LIFESTYLE VARIABLES: EVER_SMOKED: NEVER

## 2017-10-16 NOTE — DISCHARGE INSTRUCTIONS
POSTPARTUM DISCHARGE INSTRUCTIONS FOR MOM    YOB: 1985   Age: 32 y.o.               Admit Date: 10/14/2017     Discharge Date: 10/16/2017  Attending Doctor:  Jeffry Jack M.D.                  Allergies:  Review of patient's allergies indicates no known allergies.    Discharged to home by car. Discharged via wheelchair, hospital escort: Yes.  Special equipment needed: Not Applicable  Belongings with: Personal  Be sure to schedule a follow-up appointment with your primary care doctor or any specialists as instructed.     Discharge Plan:   Diet Plan: Discussed  Activity Level: Discussed  Confirmed Follow up Appointment: Patient to Call and Schedule Appointment  Confirmed Symptoms Management: Discussed  Medication Reconciliation Updated: No (Comments)  Influenza Vaccine Indication: Not indicated: Previously immunized this influenza season and > 8 years of age    REASONS TO CALL YOUR OBSTETRICIAN:  1.   Persistent fever or shaking chills (Temperature higher than 100.4)  2.   Heavy bleeding (soaking more than 1 pad per hour); Passing clots  3.   Foul odor from vagina  4.   Mastitis (Breast infection; breast pain, chills, fever, redness)  5.   Urinary pain, burning or frequency  6.   Episiotomy infection  7.   Abdominal incision infection  8.   Severe depression longer than 24 hours    HAND WASHING  · Prior to handling the baby.  · Before breastfeeding or bottle feeding baby.  · After using the bathroom or changing the baby's diaper.    WOUND CARE  Ask your physician for additional care instructions.  In general:    ·  Incision:      · Keep clean and dry.    · Do NOT lift anything heavier than your baby for up to 6 weeks.    · There should not be any opening or pus.      VAGINAL CARE  · Nothing inside vagina for 6 weeks: no sexual intercourse, tampons or douching.  · Bleeding may continue for 2-4 weeks.  Amount may vary.    · Call your physician for heavy bleeding which means soaking more than 1  "pad per hour    BIRTH CONTROL  · It is possible to become pregnant at any time after delivery and while breastfeeding.  · Plan to discuss a method of birth control with your physician at your follow up visit. visit.    DIET AND ELIMINATION  · Eating more fiber (bran cereal, fruits, and vegetables) and drinking plenty of fluids will help to avoid constipation.  · Urinary frequency after childbirth is normal.    POSTPARTUM BLUES  During the first few days after birth, you may experience a sense of the \"blues\" which may include impatience, irritability or even crying.  These feeling come and go quickly.  However, as many as 1 in 10 women experience emotional symptoms known as postpartum depression.    Postpartum depression:  May start as early as the second or third day after delivery or take several weeks or months to develop.  Symptoms of \"blues\" are present, but are more intense:  Crying spells; loss of appetite; feelings of hopelessness or loss of control; fear of touching the baby; over concern or no concern at all about the baby; little or no concern about your own appearance/caring for yourself; and/or inability to sleep or excessive sleeping.  Contact your physician if you are experiencing any of these symptoms.    Crisis Hotline:  · [place] Crisis Hotline:  1-239-YCRVYHX  Or 1-236.991.4440  · [place] Crisis Hotline:  1-319.205.4018  Or 898-777-1921    PREVENTING SHAKEN BABY:  If you are angry or stressed, PUT THE BABY IN THE CRIB, step away, take some deep breaths, and wait until you are calm to care for the baby.  DO NOT SHAKE THE BABY.  You are not alone, call a supporter for help.    · Crisis Call Center 24/7 crisis line 681-238-3565 or 1-296.612.2854  · You can also text them, text \"ANSWER\" to 490633    QUIT SMOKING/TOBACCO USE:  I understand the use of any tobacco products increases my chance of suffering from future heart disease and could cause other illnesses which may shorten my life. Quitting the use " of tobacco products is the single most important thing I can do to improve my health. For further information on smoking / tobacco cessation call a Toll Free Quit Line at 1-384.181.4338 (*[facility] [employer] [facility]) or 1-822.132.1958 (American [employer] Association) or you can access the web based program at www.lungusa.org.    · [place] Tobacco Users Help Line:  (621) 324-4497       Toll Free: 1-339.678.5960  · Quit Tobacco Program [facility] (545)385-7134    DEPRESSION / SUICIDE RISK:  As you are discharged from this [facility], it is important to learn how to keep safe from harming yourself.    Recognize the warning signs:  · Abrupt changes in personality, positive or negative- including increase in energy   · Giving away possessions  · Change in eating patterns- significant weight changes-  positive or negative  · Change in sleeping patterns- unable to sleep or sleeping all the time   · Unwillingness or inability to communicate  · Depression  · Unusual sadness, discouragement and loneliness  · Talk of wanting to die  · Neglect of personal appearance   · Rebelliousness- reckless behavior  · Withdrawal from people/activities they love  · Confusion- inability to concentrate     If you or a loved one observes any of these behaviors or has concerns about self-harm, here's what you can do:  · Talk about it- your feelings and reasons for harming yourself  · Remove any means that you might use to hurt yourself (examples: pills, rope, extension cords, firearm)  · Get professional help from the community (Mental Health, Substance Abuse, psychological counseling)  · Do not be alone:Call your Safe Contact- someone whom you trust who will be there for you.  · Call your local CRISIS HOTLINE 446-5293 or 651-812-3910  · Call your local Children's Mobile Crisis Response Team [place] (632) 101-5233 or www.Trovali  · Call the toll free National Suicide Prevention Hotlines   · National  Suicide Prevention Lifeline 953-162-PSXZ (9748)  · [facility] 800-SUICIDE (552-4957)    DISCHARGE SURVEY:  Thank you for choosing [facility].  We hope we provided you with very good care.  You may be receiving a survey in the mail.  Please fill it out.  Your opinion is valuable to us.    ADDITIONAL EDUCATIONAL MATERIALS GIVEN TO PATIENT:        My signature on this form indicates that:  1.  I have reviewed and understand the above information  2.  My questions regarding this information have been answered to my satisfaction.  3.  I have formulated a plan with my discharge nurse to obtain my prescribed medication for home.

## 2017-10-16 NOTE — CARE PLAN
Problem: Alteration in comfort related to episiotomy, vaginal repair and/or after birth pains  Goal: Patient verbalizes acceptable pain level  Patient verbalizes acceptable pain level    Problem: Potential knowledge deficit related to lack of understanding of self and  care  Goal: Patient will verbalize understanding of self and infant care  Patient verbalizes understanding to care for infant and self.

## 2017-10-16 NOTE — PROGRESS NOTES
Fundus is firm with light lochia. Lanolin given for sore breasts and she states it helps. Bonding well with baby.

## 2017-10-16 NOTE — PROGRESS NOTES
Mother states BF is going well, c/o minor soreness when BF, educated on importance of deep latch and damage caused by a shallow latch, mother declines offer for assistance with BF at this time, educated on outpatient assistance available, encouraged to call for assistance as needed.

## 2017-10-16 NOTE — DISCHARGE SUMMARY
Discharge Summary:      Gianna Bojorquez      Admit Date:   10/14/2017  Discharge Date:  10/16/2017     Admitting diagnosis:  Pregnancy  Labor and delivery, indication for care  Discharge Diagnosis: Status post   GBS positive.     History:  Past Medical History:   Diagnosis Date   • Asthma    • Congenital deformity of ear      OB History    Para Term  AB Living   2 1           SAB TAB Ectopic Molar Multiple Live Births                    # Outcome Date GA Lbr Frank/2nd Weight Sex Delivery Anes PTL Lv   2             1 Para                    Review of patient's allergies indicates no known allergies.  Patient Active Problem List    Diagnosis Date Noted   • Congenital deformity of ear 2015   • History of external ear surgery 2015        Hospital Course:   32 y.o. , now para 2, was admitted with the above mentioned diagnosis, underwent Active Labor, vaginal, spontaneous. Patient postpartum course was unremarkable, with progressive advancement in diet , ambulation and toleration of oral analgesia. Patient without complaints today and desires discharge.      Vitals:    10/14/17 2000 10/15/17 0000 10/15/17 0800 10/15/17 2000   BP: 115/72 115/79 109/69 132/79   Pulse: 80 80 70 74   Resp: 18 18 18 18   Temp: 36.8 °C (98.2 °F) 36.9 °C (98.4 °F) 36.6 °C (97.9 °F) 36.8 °C (98.2 °F)   TempSrc:       SpO2: 96% 96% 97% 96%   Weight:       Height:           Current Facility-Administered Medications   Medication Dose   • ondansetron (ZOFRAN ODT) dispertab 4 mg  4 mg    Or   • ondansetron (ZOFRAN) syringe/vial injection 4 mg  4 mg   • oxytocin (PITOCIN) infusion (for postpartum)   mL/hr   • ibuprofen (MOTRIN) tablet 600 mg  600 mg   • oxycodone-acetaminophen (PERCOCET) 5-325 MG per tablet 1 Tab  1 Tab   • oxycodone-acetaminophen (PERCOCET-10)  MG per tablet 1 Tab  1 Tab   • LR infusion     • PRN oxytocin (PITOCIN) (20 Units/1000 mL) PRN for excessive uterine bleeding - See Admin Instr   125-999 mL/hr   • misoprostol (CYTOTEC) tablet 800 mcg  800 mcg   • methylergonovine (METHERGINE) injection 0.2 mg  0.2 mg   • carboPROST (HEMABATE) injection 250 mcg  250 mcg   • docusate sodium (COLACE) capsule 100 mg  100 mg   • prenatal plus vitamin (STUARTNATAL 1+1) 27-1 MG tablet 1 Tab  1 Tab   • hydrocodone-acetaminophen (NORCO) 5-325 MG per tablet 1-2 Tab  1-2 Tab       Exam:    A, A, and O x 3 NAD    Extremities - no clubbing,cyanosis, or edema    Fundus - firm - one cm below the umbilicus    Abdomen - soft and non-tender to palpation     Labs:  Recent Labs      10/14/17   0550  10/14/17   2156   WBC  8.5  11.2*   RBC  4.14*  3.38*   HEMOGLOBIN  13.0  10.7*   HEMATOCRIT  37.5  30.9*   MCV  90.6  91.4   MCH  31.4  31.7   MCHC  34.7  34.6   RDW  43.3  43.4   PLATELETCT  283  244   MPV  9.3  9.5        Activity:   Discharge to home  Pelvic Rest x 6 weeks    Assessment:  Stable and ready for discharge.     Follow up: Dr. Jack in 6 weeks for a postpartum examination.     Discharge Meds:   Current Outpatient Prescriptions   Medication Sig Dispense Refill   • ibuprofen (MOTRIN) 600 MG Tab Take 1 Tab by mouth every 6 hours as needed (For cramping after delivery; do not give if patient is receiving ketorolac (Toradol)). 30 Tab 3   • hydrocodone-acetaminophen (NORCO) 5-325 MG Tab per tablet Take 1-2 Tabs by mouth every 6 hours as needed. 10 Tab 0       Sneha Conroy M.D.

## 2017-10-28 PROBLEM — Q17.2 MICROTIA OF LEFT EAR: Status: ACTIVE | Noted: 2017-06-24

## 2017-11-04 ENCOUNTER — OFFICE VISIT (OUTPATIENT)
Dept: URGENT CARE | Facility: CLINIC | Age: 32
End: 2017-11-04
Payer: COMMERCIAL

## 2017-11-04 VITALS
BODY MASS INDEX: 21.35 KG/M2 | SYSTOLIC BLOOD PRESSURE: 121 MMHG | HEIGHT: 67 IN | RESPIRATION RATE: 18 BRPM | OXYGEN SATURATION: 99 % | HEART RATE: 76 BPM | WEIGHT: 136 LBS | DIASTOLIC BLOOD PRESSURE: 78 MMHG | TEMPERATURE: 96.8 F

## 2017-11-04 DIAGNOSIS — R21 ACUTE MACULOPAPULAR RASH: ICD-10-CM

## 2017-11-04 PROCEDURE — 99213 OFFICE O/P EST LOW 20 MIN: CPT | Performed by: NURSE PRACTITIONER

## 2017-11-04 NOTE — PROGRESS NOTES
Chief Complaint   Patient presents with   • Rash     patient states that she develpoed a rash all over body, its very itchy. started tuesday. she hasn't had new medication, she believes it could be something she ate.       HISTORY OF PRESENT ILLNESS: Patient is a 32 y.o. female who presents to urgent care today with concerns of a rash. States she noticed a rash five days ago, starting with a quarter-sized lesion to her right forearm, has been spreading since. Describes the rash as mildly itchy. She otherwise feels well. She denies fever, chills, joint pain, headache, or malaise. She believes she may have eaten some fish which has caused her symptoms. No known contacts with similar rash. Denies history of the same. She has not tried anything for symptom relief. She is currently breastfeeding.     Patient Active Problem List    Diagnosis Date Noted   • Microtia of left ear 2017   • Congenital deformity of ear 2015   • History of external ear surgery 2015       Allergies:Tape    No current Epic-ordered outpatient prescriptions on file.     No current [facility]-ordered facility-administered medications on file.        Past Medical History:   Diagnosis Date   • Asthma    • Congenital deformity of ear        Social History   Substance Use Topics   • Smoking status: Never Smoker   • Smokeless tobacco: Never Used   • Alcohol use No       Family Status   Relation Status   • Mother Alive   • Father    • Son Alive   • Maternal Grandmother    • Neg Hx      Family History   Problem Relation Age of Onset   • Heart Disease Maternal Grandmother    • Cancer Neg Hx    • Diabetes Neg Hx        ROS:  Review of Systems   Constitutional: Negative for fever, chills, weight loss, malaise, and fatigue.   HENT: Negative for ear pain, nosebleeds, congestion, sore throat and neck pain.    Eyes: Negative for vision changes.   Neuro: Negative for headache, sensory changes, weakness, seizure, LOC.   Cardiovascular: Negative  "for chest pain, palpitations, orthopnea and leg swelling.   Respiratory: Negative for cough, sputum production, shortness of breath and wheezing.   Gastrointestinal: Negative for abdominal pain, nausea, vomiting or diarrhea.   Genitourinary: Negative for dysuria, urgency and frequency.  Musculoskeletal: Negative for falls, neck pain, back pain, joint pain, myalgias.   Skin: Positive for rash.    Exam:  Blood pressure 121/78, pulse 76, temperature 36 °C (96.8 °F), resp. rate 18, height 1.702 m (5' 7\"), weight 61.7 kg (136 lb), SpO2 99 %, currently breastfeeding.  General: well-nourished, well-developed female in NAD  Head: normocephalic, atraumatic  Eyes: PERRLA, no conjunctival injection, acuity grossly intact, lids normal.  Ears: normal shape and symmetry, no tenderness, no discharge. External canals are without any significant edema or erythema. Tympanic membranes are without any inflammation, no effusion. Gross auditory acuity is intact.  Nose: symmetrical without tenderness, no discharge.  Mouth/Throat: reasonable hygiene, no erythema, exudates or tonsillar enlargement.  Neck: no masses, range of motion within normal limits, no tracheal deviation. No obvious thyroid enlargement.   Lymph: no cervical adenopathy. No supraclavicular adenopathy.   Neuro: alert and oriented. Cranial nerves 1-12 grossly intact. No sensory deficit.   Cardiovascular: regular rate and rhythm. No edema.  Pulmonary: no distress. Chest is symmetrical with respiration, no wheezes, crackles, or rhonchi.   Musculoskeletal: no clubbing, appropriate muscle tone, gait is stable.  Skin: warm, dry, intact, no clubbing, no cyanosis. There is a scattered maculopapular rash to patient's back, abdomen, legs, and arms. There appears to be a larger, quarter sized lesion to her right forearm which may be suspicious for herald patch.   Psych: appropriate mood, affect, judgement.         Assessment/Plan:  1. Acute maculopapular rash           Suspect either " allergic reaction or viral rash (ie pityriasis rosea). May try antihistamine (after feeding) for symptom relief. Red flags discussed, states agreement and understanding.   Supportive care, differential diagnoses, and indications for immediate follow-up discussed with patient.   Pathogenesis of diagnosis discussed including typical length and natural progression.   Instructed to return to clinic or nearest emergency department for any change in condition, further concerns, or worsening of symptoms.  Patient states understanding of the plan of care and discharge instructions.  Instructed to make an appointment, for follow up, with her primary care provider next week.        Please note that this dictation was created using voice recognition software. I have made every reasonable attempt to correct obvious errors, but I expect that there are errors of grammar and possibly content that I did not discover before finalizing the note.      LEILANI Santiago.

## 2017-12-13 ENCOUNTER — HOSPITAL ENCOUNTER (OUTPATIENT)
Dept: LAB | Facility: MEDICAL CENTER | Age: 32
End: 2017-12-13
Attending: FAMILY MEDICINE
Payer: COMMERCIAL

## 2017-12-13 PROCEDURE — 36415 COLL VENOUS BLD VENIPUNCTURE: CPT

## 2017-12-13 PROCEDURE — 86480 TB TEST CELL IMMUN MEASURE: CPT

## 2017-12-15 LAB
M TB TUBERC IFN-G BLD QL: NEGATIVE
M TB TUBERC IFN-G/MITOGEN IGNF BLD: 0
M TB TUBERC IGNF/MITOGEN IGNF CONTROL: 59.8 [IU]/ML
MITOGEN IGNF BCKGRD COR BLD-ACNC: 0.04 [IU]/ML

## 2017-12-21 ENCOUNTER — APPOINTMENT (OUTPATIENT)
Dept: MEDICAL GROUP | Age: 32
End: 2017-12-21
Payer: COMMERCIAL

## 2018-01-26 ENCOUNTER — TELEPHONE (OUTPATIENT)
Dept: MEDICAL GROUP | Age: 33
End: 2018-01-26

## 2018-01-26 NOTE — TELEPHONE ENCOUNTER
ESTABLISHED PATIENT PRE-VISIT PLANNING     Note: Patient will not be contacted if there is no indication to call.     1.  Reviewed notes from the last few office visits within the medical group: Yes    2.  If any orders were placed at last visit or intended to be done for this visit (i.e. 6 mos follow-up), do we have Results/Consult Notes?        •  Labs - Labs were not ordered at last office visit.   Note: If patient appointment is for lab review and patient did not complete labs, check with provider if OK to reschedule patient until labs completed.       •  Imaging - Imaging was not ordered at last office visit.       •  Referrals - No referrals were ordered at last office visit.    3. Is this appointment scheduled as a Hospital Follow-Up? No    4.  Immunizations were updated in Epic using WebIZ?: Epic matches WebIZ       •  Web Iz Recommendations: Patient is up to date on all vaccines    5.  Patient is due for the following Health Maintenance Topics:   There are no preventive care reminders to display for this patient.    - Patient is up-to-date on all Health Maintenance topics. No records have been requested at this time.    6.  Patient was NOT informed to arrive 15 min prior to their scheduled appointment and bring in their medication bottles.

## 2018-02-01 ENCOUNTER — OFFICE VISIT (OUTPATIENT)
Dept: MEDICAL GROUP | Age: 33
End: 2018-02-01
Payer: COMMERCIAL

## 2018-02-01 VITALS
DIASTOLIC BLOOD PRESSURE: 76 MMHG | TEMPERATURE: 97.8 F | HEIGHT: 67 IN | HEART RATE: 75 BPM | SYSTOLIC BLOOD PRESSURE: 116 MMHG | BODY MASS INDEX: 22.16 KG/M2 | OXYGEN SATURATION: 96 % | WEIGHT: 141.2 LBS

## 2018-02-01 DIAGNOSIS — Z00.00 ROUTINE GENERAL MEDICAL EXAMINATION AT HEALTH CARE FACILITY: ICD-10-CM

## 2018-02-01 PROCEDURE — 99395 PREV VISIT EST AGE 18-39: CPT | Performed by: INTERNAL MEDICINE

## 2018-02-01 ASSESSMENT — PATIENT HEALTH QUESTIONNAIRE - PHQ9: CLINICAL INTERPRETATION OF PHQ2 SCORE: 0

## 2018-02-02 NOTE — PROGRESS NOTES
Chief Complaint:     Gianna Bojorquez is a 32 y.o. female who presents for annual exam    Pt has GYN provider: yes  Last colonoscopy: Not indicated.  Bone density test:N\A  Gardasil:N\A   Last Td: 10/9/14.  Regular exercise: yes       Routine general medical examination at health care facility  Patient is generally healthy. She is taking prenatal vitamins currently. She has 3 months old baby girl. She has not had her menstrual period yet as she is currently breast-feeding. Patient has normal Pap smear on 4/4/17. She states that she has mild sensation of numbness on left upper extremity when she lies on her right hand side. She reported that she does not use a pillow when she sleeps. She denied pain on neck or extremities. No history of neck injury.        She  has a past medical history of Asthma and Congenital deformity of ear.  She  has a past surgical history that includes other and ear reconstruction.  Current Outpatient Prescriptions   Medication Sig Dispense Refill   • Prenatal Vit-Fe Fumarate-FA (PRENATAL PO) Take  by mouth.       No current facility-administered medications for this visit.      Social History   Substance Use Topics   • Smoking status: Never Smoker   • Smokeless tobacco: Never Used   • Alcohol use No       Review Of Systems  Denies fever, chills, or sweats, unexplained weight changes  Skin: negative for rash, changing moles, abnormal pigmentation, hair or nail changes.  Eyes: negative for visual blurring, double vision, eye pain, floaters and discharge from eyes  Ears/Nose/Throat: negative for tinnitus, vertigo, oral or dental problem, hoarseness, frequent URI's, sinus trouble, persistent sore throat  Respiratory: negative for persistent cough, hemoptysis, dyspnea, wheezing  Cardiovascular: negative for palpitations, tachycardia, irregular heart beat, chest pain or pressure or peripheral edema.  Breast: Denies breast tenderness, mass,  changes in size or contour, or abnormal cyclic  "discomfort.  Gastrointestinal: negative for dysphagia or odynophagia, nausea, heartburn or reflux, abdominal pain, hemorrhoids, constipation or diarrhea, black stool or bloody stool  Genitourinary: negative for nocturia, dysuria, frequency, incontinence, abnormal vaginal discharge, dysparunia or abnormal vaginal bleeding  Musculoskeletal: negative for joint swelling and muscle pain/ soreness  Neurologic: negative for new or changing headaches, new weakness tremor  Psychiatric: negative for mood or sleep disturbance, anxiety, depression, sexual difficulties  Hematologic/Lymphatic/Immunologic: negative for pallor, unusual bruising, swollen glands, HIV risk factors  Endocrine: negative for temperature intolerance, polydipsia, polyuria.       PHYSICAL EXAMINATION:  Blood pressure 116/76, pulse 75, temperature 36.6 °C (97.8 °F), height 1.702 m (5' 7\"), weight 64 kg (141 lb 3.2 oz), last menstrual period 01/04/2017, SpO2 96 %, currently breastfeeding.  Body mass index is 22.12 kg/m².  Wt Readings from Last 4 Encounters:   02/01/18 64 kg (141 lb 3.2 oz)   11/04/17 61.7 kg (136 lb)   10/14/17 67.6 kg (149 lb)   11/02/16 62.1 kg (137 lb)       Constitutional: Alert, no distress.  Skin: Warm, dry, good turgor, no rashes or suspicious lesions in visible areas.  Eye: pupils equal, round and reactive to light, conjunctivae clear, lids normal.  ENMT: Lips without lesions, good dentition, oropharynx clear. Pinnae skin normal with no lesions. TM pearly gray with normal light reflex.   Neck: supple, no masses. No anterior or supraclavicular adenopathy. No carotid bruits no thyromegaly.  Respiratory: Unlabored respiratory effort, lungs clear to auscultation, no wheezes, no ronchi.  Cardiovascular: Normal S1, S2, no murmur, no peripheral edema.  Abdomen: no CVAT abdomen Soft, non-tender, no masses, no hepatosplenomegaly.  Psych: Alert and oriented x3, normal affect and mood.  Musc/Skel: 5/5 strength and normal motion UE and LE " proximal and distal.        ASSESSMENT/PLAN:  1. Routine general medical examination at health care facility      Counseling for healthy diet and regular physical exercise. Patient has normal Pap smear on 4/4/17. Counseling for doing woman's health exam once a year and Pap smear every 3 years.  Discussed possible causes of numbness on the left upper extremity. Patient does not want to do x-ray or additional intervention currently. Discussed and demonstrated to do gentle stretching exercises on the neck and shoulders.  Discussed to use a pillow when she sleeps. Suggest to try cervical spine supporting pillow.         HCM:  Immunizations are up to date.   Labs ordered  Immunizations per orders  Pt counseled about skin care, diet, supplements, and exercise.  Next office visit for recheck of chronic medical conditions is due in  1 year.

## 2018-04-17 ENCOUNTER — HOSPITAL ENCOUNTER (OUTPATIENT)
Dept: LAB | Facility: MEDICAL CENTER | Age: 33
End: 2018-04-17
Attending: OBSTETRICS & GYNECOLOGY
Payer: COMMERCIAL

## 2018-04-17 PROCEDURE — 88175 CYTOPATH C/V AUTO FLUID REDO: CPT

## 2018-04-18 LAB — CYTOLOGY REG CYTOL: NORMAL

## 2018-09-18 ENCOUNTER — HOSPITAL ENCOUNTER (OUTPATIENT)
Dept: LAB | Facility: MEDICAL CENTER | Age: 33
End: 2018-09-18
Attending: FAMILY MEDICINE
Payer: COMMERCIAL

## 2018-09-18 PROCEDURE — 36415 COLL VENOUS BLD VENIPUNCTURE: CPT

## 2018-09-18 PROCEDURE — 86480 TB TEST CELL IMMUN MEASURE: CPT

## 2018-09-19 LAB
M TB TUBERC IFN-G BLD QL: NEGATIVE
M TB TUBERC IFN-G/MITOGEN IGNF BLD: 0
M TB TUBERC IGNF/MITOGEN IGNF CONTROL: 57.19 [IU]/ML
MITOGEN IGNF BCKGRD COR BLD-ACNC: 0.01 [IU]/ML

## 2018-11-02 ENCOUNTER — OFFICE VISIT (OUTPATIENT)
Dept: URGENT CARE | Facility: CLINIC | Age: 33
End: 2018-11-02
Payer: COMMERCIAL

## 2018-11-02 ENCOUNTER — HOSPITAL ENCOUNTER (EMERGENCY)
Facility: MEDICAL CENTER | Age: 33
End: 2018-11-02
Attending: EMERGENCY MEDICINE
Payer: COMMERCIAL

## 2018-11-02 ENCOUNTER — APPOINTMENT (OUTPATIENT)
Dept: RADIOLOGY | Facility: MEDICAL CENTER | Age: 33
End: 2018-11-02
Attending: EMERGENCY MEDICINE
Payer: COMMERCIAL

## 2018-11-02 VITALS
BODY MASS INDEX: 19.93 KG/M2 | SYSTOLIC BLOOD PRESSURE: 114 MMHG | TEMPERATURE: 97.9 F | RESPIRATION RATE: 16 BRPM | OXYGEN SATURATION: 97 % | WEIGHT: 127 LBS | HEART RATE: 72 BPM | DIASTOLIC BLOOD PRESSURE: 80 MMHG | HEIGHT: 67 IN

## 2018-11-02 VITALS
HEIGHT: 67 IN | RESPIRATION RATE: 18 BRPM | WEIGHT: 127.21 LBS | OXYGEN SATURATION: 96 % | SYSTOLIC BLOOD PRESSURE: 109 MMHG | BODY MASS INDEX: 19.97 KG/M2 | DIASTOLIC BLOOD PRESSURE: 70 MMHG | TEMPERATURE: 97.2 F | HEART RATE: 64 BPM

## 2018-11-02 DIAGNOSIS — R82.2 BILIRUBINURIA: ICD-10-CM

## 2018-11-02 DIAGNOSIS — R10.9 ACUTE RIGHT FLANK PAIN: Primary | ICD-10-CM

## 2018-11-02 DIAGNOSIS — R31.9 HEMATURIA, UNSPECIFIED TYPE: ICD-10-CM

## 2018-11-02 DIAGNOSIS — R80.9 PROTEINURIA, UNSPECIFIED TYPE: ICD-10-CM

## 2018-11-02 DIAGNOSIS — N20.1 URETEROLITHIASIS: ICD-10-CM

## 2018-11-02 LAB
ALBUMIN SERPL BCP-MCNC: 4.6 G/DL (ref 3.2–4.9)
ALBUMIN/GLOB SERPL: 1.5 G/DL
ALP SERPL-CCNC: 57 U/L (ref 30–99)
ALT SERPL-CCNC: 18 U/L (ref 2–50)
ANION GAP SERPL CALC-SCNC: 6 MMOL/L (ref 0–11.9)
APPEARANCE UR: NORMAL
AST SERPL-CCNC: 22 U/L (ref 12–45)
BASOPHILS # BLD AUTO: 0 % (ref 0–1.8)
BASOPHILS # BLD: 0 K/UL (ref 0–0.12)
BILIRUB SERPL-MCNC: 1.1 MG/DL (ref 0.1–1.5)
BILIRUB UR STRIP-MCNC: NORMAL MG/DL
BUN SERPL-MCNC: 17 MG/DL (ref 8–22)
CALCIUM SERPL-MCNC: 8.8 MG/DL (ref 8.4–10.2)
CHLORIDE SERPL-SCNC: 104 MMOL/L (ref 96–112)
CO2 SERPL-SCNC: 25 MMOL/L (ref 20–33)
COLOR UR AUTO: NORMAL
CREAT SERPL-MCNC: 0.63 MG/DL (ref 0.5–1.4)
EOSINOPHIL # BLD AUTO: 0.01 K/UL (ref 0–0.51)
EOSINOPHIL NFR BLD: 0.3 % (ref 0–6.9)
ERYTHROCYTE [DISTWIDTH] IN BLOOD BY AUTOMATED COUNT: 39.2 FL (ref 35.9–50)
GLOBULIN SER CALC-MCNC: 3.1 G/DL (ref 1.9–3.5)
GLUCOSE SERPL-MCNC: 137 MG/DL (ref 65–99)
GLUCOSE UR STRIP.AUTO-MCNC: NORMAL MG/DL
HCG SERPL QL: NEGATIVE
HCT VFR BLD AUTO: 37.3 % (ref 37–47)
HGB BLD-MCNC: 13 G/DL (ref 12–16)
IMM GRANULOCYTES # BLD AUTO: 0 K/UL (ref 0–0.11)
IMM GRANULOCYTES NFR BLD AUTO: 0 % (ref 0–0.9)
KETONES UR STRIP.AUTO-MCNC: NORMAL MG/DL
LEUKOCYTE ESTERASE UR QL STRIP.AUTO: NORMAL
LYMPHOCYTES # BLD AUTO: 0.3 K/UL (ref 1–4.8)
LYMPHOCYTES NFR BLD: 9.8 % (ref 22–41)
MCH RBC QN AUTO: 31 PG (ref 27–33)
MCHC RBC AUTO-ENTMCNC: 34.9 G/DL (ref 33.6–35)
MCV RBC AUTO: 88.8 FL (ref 81.4–97.8)
MONOCYTES # BLD AUTO: 0.2 K/UL (ref 0–0.85)
MONOCYTES NFR BLD AUTO: 6.5 % (ref 0–13.4)
NEUTROPHILS # BLD AUTO: 2.56 K/UL (ref 2–7.15)
NEUTROPHILS NFR BLD: 83.4 % (ref 44–72)
NITRITE UR QL STRIP.AUTO: NORMAL
NRBC # BLD AUTO: 0 K/UL
NRBC BLD-RTO: 0 /100 WBC
PH UR STRIP.AUTO: 5.5 [PH] (ref 5–8)
PLATELET # BLD AUTO: 215 K/UL (ref 164–446)
PMV BLD AUTO: 9.1 FL (ref 9–12.9)
POTASSIUM SERPL-SCNC: 3.4 MMOL/L (ref 3.6–5.5)
PROT SERPL-MCNC: 7.7 G/DL (ref 6–8.2)
PROT UR QL STRIP: 30 MG/DL
RBC # BLD AUTO: 4.2 M/UL (ref 4.2–5.4)
RBC UR QL AUTO: NORMAL
SODIUM SERPL-SCNC: 135 MMOL/L (ref 135–145)
SP GR UR STRIP.AUTO: >=1.03
UROBILINOGEN UR STRIP-MCNC: 1 MG/DL
WBC # BLD AUTO: 3.1 K/UL (ref 4.8–10.8)

## 2018-11-02 PROCEDURE — 74176 CT ABD & PELVIS W/O CONTRAST: CPT

## 2018-11-02 PROCEDURE — 99284 EMERGENCY DEPT VISIT MOD MDM: CPT

## 2018-11-02 PROCEDURE — 81002 URINALYSIS NONAUTO W/O SCOPE: CPT | Performed by: PHYSICIAN ASSISTANT

## 2018-11-02 PROCEDURE — 85025 COMPLETE CBC W/AUTO DIFF WBC: CPT

## 2018-11-02 PROCEDURE — 99215 OFFICE O/P EST HI 40 MIN: CPT | Performed by: PHYSICIAN ASSISTANT

## 2018-11-02 PROCEDURE — 36415 COLL VENOUS BLD VENIPUNCTURE: CPT

## 2018-11-02 PROCEDURE — 80053 COMPREHEN METABOLIC PANEL: CPT

## 2018-11-02 PROCEDURE — 84703 CHORIONIC GONADOTROPIN ASSAY: CPT

## 2018-11-02 RX ORDER — ONDANSETRON 4 MG/1
4 TABLET, FILM COATED ORAL EVERY 4 HOURS PRN
Qty: 12 TAB | Refills: 0 | Status: SHIPPED | OUTPATIENT
Start: 2018-11-02 | End: 2019-06-04

## 2018-11-02 RX ORDER — OXYCODONE HYDROCHLORIDE AND ACETAMINOPHEN 5; 325 MG/1; MG/1
1-2 TABLET ORAL EVERY 4 HOURS PRN
Qty: 15 TAB | Refills: 0 | Status: SHIPPED | OUTPATIENT
Start: 2018-11-02 | End: 2018-11-05

## 2018-11-02 RX ORDER — TAMSULOSIN HYDROCHLORIDE 0.4 MG/1
0.4 CAPSULE ORAL DAILY
Qty: 30 CAP | Refills: 0 | Status: SHIPPED | OUTPATIENT
Start: 2018-11-02 | End: 2019-06-04

## 2018-11-02 ASSESSMENT — PAIN DESCRIPTION - DESCRIPTORS: DESCRIPTORS: BURNING;SHARP

## 2018-11-02 ASSESSMENT — PAIN SCALES - GENERAL
PAINLEVEL_OUTOF10: 3
PAINLEVEL_OUTOF10: 5

## 2018-11-02 NOTE — PATIENT INSTRUCTIONS
Hematuria, Adult  Hematuria is blood in your urine. It can be caused by a bladder infection, kidney infection, prostate infection, kidney stone, or cancer of your urinary tract. Infections can usually be treated with medicine, and a kidney stone usually will pass through your urine. If neither of these is the cause of your hematuria, further workup to find out the reason may be needed.  It is very important that you tell your health care provider about any blood you see in your urine, even if the blood stops without treatment or happens without causing pain. Blood in your urine that happens and then stops and then happens again can be a symptom of a very serious condition. Also, pain is not a symptom in the initial stages of many urinary cancers.  Follow these instructions at home:  · Drink lots of fluid, 3-4 quarts a day. If you have been diagnosed with an infection, cranberry juice is especially recommended, in addition to large amounts of water.  · Avoid caffeine, tea, and carbonated beverages because they tend to irritate the bladder.  · Avoid alcohol because it may irritate the prostate.  · Take all medicines as directed by your health care provider.  · If you were prescribed an antibiotic medicine, finish it all even if you start to feel better.  · If you have been diagnosed with a kidney stone, follow your health care provider's instructions regarding straining your urine to catch the stone.  · Empty your bladder often. Avoid holding urine for long periods of time.  · After a bowel movement, women should cleanse front to back. Use each tissue only once.  · Empty your bladder before and after sexual intercourse if you are a female.  Contact a health care provider if:  · You develop back pain.  · You have a fever.  · You have a feeling of sickness in your stomach (nausea) or vomiting.  · Your symptoms are not better in 3 days. Return sooner if you are getting worse.  Get help right away if:  · You develop  severe vomiting and are unable to keep the medicine down.  · You develop severe back or abdominal pain despite taking your medicines.  · You begin passing a large amount of blood or clots in your urine.  · You feel extremely weak or faint, or you pass out.  This information is not intended to replace advice given to you by your health care provider. Make sure you discuss any questions you have with your health care provider.  Document Released: 12/18/2006 Document Revised: 05/25/2017 Document Reviewed: 08/18/2014  [employer] Interactive Patient Education © 2017 [employer]

## 2018-11-02 NOTE — ED TRIAGE NOTES
"Chief Complaint   Patient presents with   • Sent from Urgent Care   • Flank Pain     Right, started today     /80   Pulse 74   Temp 36.2 °C (97.1 °F)   Resp 16   Ht 1.702 m (5' 7\")   Wt 57.7 kg (127 lb 3.3 oz)   SpO2 96%   Breastfeeding? Yes   BMI 19.92 kg/m²     "

## 2018-11-03 NOTE — PROGRESS NOTES
Patient given DC instructions and verbalized understanding. Patient ambulated out of ED in good condition. PAtient signed narc agreement

## 2018-11-03 NOTE — DISCHARGE INSTRUCTIONS
Return for fever, persistent vomiting, increasing pain or other concerns.  Drink plenty of fluids.

## 2018-11-03 NOTE — ED PROVIDER NOTES
"CHIEF COMPLAINT  Chief Complaint   Patient presents with   • Sent from Urgent Care   • Flank Pain     Right, started today       HPI  Gianna Bojorquez is a 33 y.o. female who presents with right flank pain that started earlier today.  It was rather rapid in onset.  The patient had dark urine when this occurred.  She was seen in urgent care and noted to have blood in her urine but no evidence of UTI.  Subsequently she was sent to the emergency room.  The patient notes no vomiting but has had nausea.  No fever.  No diarrhea.  She does note prior abdominal surgery-she required a piece of her inferior right ribs for an ear surgery.    REVIEW OF SYSTEMS  All other systems are negative.     PAST MEDICAL HISTORY  Past Medical History:   Diagnosis Date   • Asthma    • Congenital deformity of ear        FAMILY HISTORY  Family History   Problem Relation Age of Onset   • Hypertension Father    • Stroke Father    • Heart Disease Maternal Grandmother    • Cancer Neg Hx    • Diabetes Neg Hx        SOCIAL HISTORY  Social History     Social History   • Marital status:      Spouse name: N/A   • Number of children: 1   • Years of education: N/A     Social History Main Topics   • Smoking status: Never Smoker   • Smokeless tobacco: Never Used   • Alcohol use No   • Drug use: No   • Sexual activity: Yes     Partners: Male     Other Topics Concern   • Not on file     Social History Narrative   • No narrative on file       SURGICAL HISTORY  Past Surgical History:   Procedure Laterality Date   • EAR RECONSTRUCTION     • OTHER      Left ear surgery, bone placement       CURRENT MEDICATIONS  Home Medications    **Home medications have not yet been reviewed for this encounter**         ALLERGIES  Allergies   Allergen Reactions   • Tape        PHYSICAL EXAM  VITAL SIGNS: /80   Pulse 74   Temp 36.2 °C (97.1 °F)   Resp 16   Ht 1.702 m (5' 7\")   Wt 57.7 kg (127 lb 3.3 oz)   SpO2 96%   Breastfeeding? Yes   BMI 19.92 kg/m²  "     Constitutional: Well developed, Well nourished, No acute distress, Non-toxic appearance.   HENT: Normocephalic, Atraumatic,  mucous membranes moist, no erythema, exudates, swelling, or masses, nares patent  Eyes: nonicteric  Neck: Supple, no meningismus  Lymphatic: No lymphadenopathy noted.   Cardiovascular: Regular rate and rhythm, no gallops rubs or murmurs  Lungs: Clear bilaterally   Abdomen: Bowel sounds normal, Soft, No tenderness  Skin: Warm, Dry, no rash  Back: No tenderness, No CVA tenderness.   Genitalia: Deferred  Rectal: Deferred  Extremities: No edema  Neurologic: Alert, appropriate, follows commands, moving all extremities, normal speech   Psychiatric: Affect normal    RADIOLOGY/PROCEDURES  CT-RENAL COLIC EVALUATION(A/P W/O)   Final Result         1. Obstructing 4 mm calculus in the mid right ureter with moderate right hydronephrosis.      2. No evidence of inflammatory change in the abdomen or pelvis.  The study is however limited due to nonuse of intravenous contrast          White count 3.1, hemoglobin 13, sodium 135, potassium 3.4, bicarb 25, BUN 17, creatinine 0.63, glucose 137, dip urine demonstrates blood no leukoesterase or nitrite    COURSE & MEDICAL DECISION MAKING  Pertinent Labs & Imaging studies reviewed. (See chart for details)  This is a 33-year-old female who presents with right flank pain and hematuria.  She has evidence of a 4 mm stone in the right mid ureter with moderate hydronephrosis.  There is no evidence of urinary tract infection.  The patient will be referred to urology and written for pain control as well as Flomax.  She will strain her urine and call urology on Monday for follow-up appointment.  She was given instruction to return for fever persistent vomiting or increasing pain.    FINAL IMPRESSION  1.  Ureterolithiasis  2.   3.         Electronically signed by: Pete Pal, 11/2/2018 5:48 PM

## 2018-11-03 NOTE — PROGRESS NOTES
"Subjective:      Pt is a 33 y.o. female who presents with Back Pain (R side x 1 hour) and Fatigue (\"Tired and headache\" x 2 days)            HPI  Pt notes one hour of intense right flank pain with associated nausea and fatigue. Pt denies hx of kidney stones and notes headache x 2 days with feeling of being tired. Pt has not taken any Rx medications for this condition. Pt states the pain is a 9-10/10, aching in nature and worse \"right now\". Pt denies CP, SOB, diarrhea, paresthesias,  dizziness, change in vision, hives, or other joint pain. The pt's medication list, problem list, and allergies have been evaluated and reviewed during today's visit.    PMH:  Past Medical History:   Diagnosis Date   • Asthma    • Congenital deformity of ear        PSH:  Past Surgical History:   Procedure Laterality Date   • EAR RECONSTRUCTION     • OTHER      Left ear surgery, bone placement       Fam Hx:    family history includes Heart Disease in her maternal grandmother; Hypertension in her father; Stroke in her father.  Family Status   Relation Status   • Mo Alive   • Fa    • Son Alive   • MGMo (Not Specified)   • Neg Hx (Not Specified)       Soc HX:  Social History     Social History   • Marital status:      Spouse name: N/A   • Number of children: 1   • Years of education: N/A     Occupational History   • Not on file.     Social History Main Topics   • Smoking status: Never Smoker   • Smokeless tobacco: Never Used   • Alcohol use No   • Drug use: No   • Sexual activity: Yes     Partners: Male     Other Topics Concern   • Not on file     Social History Narrative   • No narrative on file         Medications:    Current Outpatient Prescriptions:   •  oxyCODONE-acetaminophen (PERCOCET) 5-325 MG Tab, Take 1-2 Tabs by mouth every four hours as needed for up to 3 days., Disp: 15 Tab, Rfl: 0  •  ondansetron (ZOFRAN) 4 MG Tab tablet, Take 1 Tab by mouth every four hours as needed for Nausea/Vomiting., Disp: 12 Tab, Rfl: 0  •  " "tamsulosin (FLOMAX) 0.4 MG capsule, Take 1 Cap by mouth every day., Disp: 30 Cap, Rfl: 0  •  Prenatal Vit-Fe Fumarate-FA (PRENATAL PO), Take  by mouth., Disp: , Rfl:       Allergies:  Tape    ROS  Constitutional: Negative for fever, chills and malaise/fatigue.   HENT: Negative for congestion and sore throat.    Eyes: Negative for blurred vision, double vision and photophobia.   Respiratory: Negative for cough and shortness of breath.  Cardiovascular: Negative for chest pain and palpitations.   Gastrointestinal: POS nausea, NEG vomiting, abdominal pain, diarrhea and constipation.   Genitourinary: Negative for dysuria and +right flank pain.   Musculoskeletal: Negative for joint pain and myalgias.   Skin: Negative for itching and rash.   Neurological: POS headaches.   Endo/Heme/Allergies: Does not bruise/bleed easily.   Psychiatric/Behavioral: Negative for depression. The patient is not nervous/anxious.           Objective:     /80 (BP Location: Left arm, Patient Position: Sitting)   Pulse 72   Temp 36.6 °C (97.9 °F) (Temporal)   Resp 16   Ht 1.702 m (5' 7\")   Wt 57.6 kg (127 lb)   LMP 01/04/2017   SpO2 97%   Breastfeeding? Yes   BMI 19.89 kg/m²      Physical Exam   Musculoskeletal:        Back:           Constitutional: PT is oriented to person, place, and time. PT appears well-developed and well-nourished. No distress.   HENT:   Head: Normocephalic and atraumatic.   Mouth/Throat: Oropharynx is clear and moist. No oropharyngeal exudate.   Eyes: Conjunctivae normal and EOM are normal. Pupils are equal, round, and reactive to light.   Neck: Normal range of motion. Neck supple. No thyromegaly present.   Cardiovascular: Normal rate, regular rhythm, normal heart sounds and intact distal pulses.  Exam reveals no gallop and no friction rub.    No murmur heard.  Pulmonary/Chest: Effort normal and breath sounds normal. No respiratory distress. PT has no wheezes. PT has no rales. Pt exhibits no tenderness. "   Abdominal: Soft. Bowel sounds are normal. PT exhibits no distension and no mass. There is no tenderness. There is no rebound and no guarding.   Neurological: PT is alert and oriented to person, place, and time. PT has normal reflexes. No cranial nerve deficit.   Skin: Skin is warm and dry. No rash noted. PT is not diaphoretic. No erythema.       Psychiatric: PT has a normal mood and affect. PT behavior is normal. Judgment and thought content normal.     URINALYSIS:  Component Results     Component Value Ref Range & Units Status   POC Color brown ### Negative Final   POC Appearance cloudy  Negative Final   POC Leukocyte Esterase neg  Negative Final   POC Nitrites neg  Negative Final   POC Urobiligen 1.0  Negative (0.2) mg/dL Final   POC Protein 30 ### Negative mg/dL Final   POC Urine PH 5.5  5.0 - 8.0 Final   POC Blood Lg ### Negative Final   POC Specific Gravity >=1.030  <1.005 - >1.030 Final   POC Ketones neg  Negative mg/dL Final   POC Bilirubin sm ### Negative mg/dL Final   POC Glucose neg  Negative mg/dL Final          Assessment/Plan:     1. Acute right flank pain    - POCT Urinalysis-->see results above    2. Hematuria, unspecified type      3. Proteinuria, unspecified type      4. Bilirubinuria      Sudden onset x 1 hour of intense right flank pain with 9-10/10 pain and NV with changes in urinalysis and no PMH of nephrolithiasis  will require a higher level of triage and further testing than can be performed in a timely manner at this .  TO [facility] ER NOW for further evaluation. Spoke with Dr. Mcneil on the phone, attending at the ER , and he graciously accepted transfer of care for further imaging and evaluation of the pt. Reiterated to patient that although a provider to provider transfer was made this will not necessarily expedite the ER process.  Pt to go by POV with significant other driving to ER now  Rest, fluids encouraged.  AVS with medical info given.  Pt was in full understanding  and agreement with the plan.  Follow-up as needed if symptoms worsen or fail to improve.

## 2018-12-14 ENCOUNTER — HOSPITAL ENCOUNTER (OUTPATIENT)
Dept: RADIOLOGY | Facility: MEDICAL CENTER | Age: 33
End: 2018-12-14
Attending: UROLOGY
Payer: COMMERCIAL

## 2018-12-14 DIAGNOSIS — N20.1 CALCULUS OF URETER: ICD-10-CM

## 2018-12-14 PROCEDURE — 74018 RADEX ABDOMEN 1 VIEW: CPT

## 2018-12-18 ENCOUNTER — HOSPITAL ENCOUNTER (OUTPATIENT)
Dept: LAB | Facility: MEDICAL CENTER | Age: 33
End: 2018-12-18
Attending: PHYSICIAN ASSISTANT
Payer: COMMERCIAL

## 2018-12-18 PROCEDURE — 82365 CALCULUS SPECTROSCOPY: CPT

## 2018-12-18 PROCEDURE — 87086 URINE CULTURE/COLONY COUNT: CPT

## 2018-12-19 LAB
AMBIGUOUS DTTM AMBI4: NORMAL
AMBIGUOUS DTTM AMBI4: NORMAL
SIGNIFICANT IND 70042: NORMAL
SITE SITE: NORMAL
SOURCE SOURCE: NORMAL

## 2018-12-21 LAB
BACTERIA UR CULT: NORMAL
SIGNIFICANT IND 70042: NORMAL
SITE SITE: NORMAL
SOURCE SOURCE: NORMAL

## 2018-12-23 LAB
APPEARANCE STONE: NORMAL
COMPN STONE: NORMAL
NUMBER STONE: 1
SIZE STONE: NORMAL MM
SPECIMEN WT: 19 MG

## 2019-06-04 ENCOUNTER — OFFICE VISIT (OUTPATIENT)
Dept: MEDICAL GROUP | Age: 34
End: 2019-06-04
Payer: COMMERCIAL

## 2019-06-04 VITALS
OXYGEN SATURATION: 95 % | HEART RATE: 70 BPM | HEIGHT: 67 IN | TEMPERATURE: 98.9 F | WEIGHT: 132 LBS | DIASTOLIC BLOOD PRESSURE: 52 MMHG | BODY MASS INDEX: 20.72 KG/M2 | SYSTOLIC BLOOD PRESSURE: 98 MMHG

## 2019-06-04 DIAGNOSIS — Z00.00 ROUTINE GENERAL MEDICAL EXAMINATION AT HEALTH CARE FACILITY: ICD-10-CM

## 2019-06-04 DIAGNOSIS — K64.9 BLEEDING HEMORRHOID: ICD-10-CM

## 2019-06-04 PROCEDURE — 99213 OFFICE O/P EST LOW 20 MIN: CPT | Mod: 25 | Performed by: INTERNAL MEDICINE

## 2019-06-04 PROCEDURE — 99395 PREV VISIT EST AGE 18-39: CPT | Performed by: INTERNAL MEDICINE

## 2019-06-04 RX ORDER — CHLORAL HYDRATE 500 MG
1000 CAPSULE ORAL
COMMUNITY

## 2019-06-04 ASSESSMENT — PATIENT HEALTH QUESTIONNAIRE - PHQ9: CLINICAL INTERPRETATION OF PHQ2 SCORE: 0

## 2019-06-04 ASSESSMENT — PAIN SCALES - GENERAL: PAINLEVEL: NO PAIN

## 2019-06-04 NOTE — PROGRESS NOTES
Chief Complaint:     Gianna Bojorquez is a 33 y.o. female who presents for annual exam    Pt has GYN provider: yes  Last colonoscopy: Not indicated  Bone density test:N\A  Gardasil:N\A   Last Td: 10/9/14  Regular exercise: yes      Routine general medical examination at health care facility  Patient has a history of kidney stones, 11/2/18. She has been generally healthy since that time although reports an episode of blood in her stool. Patient's LMP was May 18th, 2019.  She has normal menstrual cycle once a month.  She is not breastfeeding. Denies hematuria or  flank pain.    Bleeding hemorrhoid  Patient reports an episode of blood in her stool on February 7th, 2019. She denies any other episodes since that time. She reports that she has had hemorrhoids in the past that have never had to be surgically treated. After her second pregnancy, patient feels her hemorrhoids have gotten worse. Per patient, her hemorrhoids in the past were rare but they are now more frequent and she is able to feel them with bowel movements. Denies rectal pain. She has no family history of colon cancer but is concerned for potential colon cancer, polyps, or tumor. She wants to know if this is treatable with rubber banding for hemorrhoids and request to refer to GI. Explained to the patient that if the hemorrhoid is external hemorrhoid, rubber banding is not possible. She would like a referral to gastroenterologist for further assessment and discussion of treatment.       She  has a past medical history of Asthma and Congenital deformity of ear.  She  has a past surgical history that includes other and ear reconstruction.  Current Outpatient Prescriptions   Medication Sig Dispense Refill   • Omega-3 Fatty Acids (FISH OIL) 1000 MG Cap capsule Take 1,000 mg by mouth 3 times a day, with meals.     • ondansetron (ZOFRAN) 4 MG Tab tablet Take 1 Tab by mouth every four hours as needed for Nausea/Vomiting. (Patient not taking: Reported on 6/4/2019) 12 Tab 0    • tamsulosin (FLOMAX) 0.4 MG capsule Take 1 Cap by mouth every day. (Patient not taking: Reported on 6/4/2019) 30 Cap 0   • Prenatal Vit-Fe Fumarate-FA (PRENATAL PO) Take  by mouth.       No current facility-administered medications for this visit.      Social History   Substance Use Topics   • Smoking status: Never Smoker   • Smokeless tobacco: Never Used   • Alcohol use No       Review Of Systems  Denies fever, chills, or sweats, unexplained weight changes  Skin: negative for rash, changing moles, abnormal pigmentation, hair or nail changes.  Eyes: negative for visual blurring, double vision, eye pain, floaters and discharge from eyes  Ears/Nose/Throat: negative for tinnitus, vertigo, oral or dental problem, hoarseness, frequent URI's, sinus trouble, persistent sore throat  Respiratory: negative for persistent cough, hemoptysis, dyspnea, wheezing  Cardiovascular: negative for palpitations, tachycardia, irregular heart beat, chest pain or pressure or peripheral edema.  Breast: Denies breast tenderness, mass,  changes in size or contour, or abnormal cyclic discomfort.  Gastrointestinal: Positive for bloody stool, constipation, hemorrhoids. Negative for dysphagia or odynophagia, nausea, heartburn or reflux, diarrhea, or black stool or abdominal pain.  Genitourinary: negative for nocturia, dysuria, frequency, incontinence, abnormal vaginal discharge, dysparunia or abnormal vaginal bleeding  Musculoskeletal: negative for joint swelling and muscle pain/ soreness  Neurologic: negative for new or changing headaches, new weakness tremor  Psychiatric: negative for mood or sleep disturbance, anxiety, depression, sexual difficulties  Hematologic/Lymphatic/Immunologic: negative for pallor, unusual bruising, swollen glands, HIV risk factors  Endocrine: negative for temperature intolerance, polydipsia, polyuria.       PHYSICAL EXAMINATION:  BP (!) 98/52 (BP Location: Right arm, Patient Position: Sitting, BP Cuff Size: Adult)   " Pulse 70   Temp 37.2 °C (98.9 °F) (Temporal)   Ht 1.702 m (5' 7\")   Wt 59.9 kg (132 lb)   SpO2 95%   Body mass index is 20.67 kg/m².  Wt Readings from Last 4 Encounters:   06/04/19 59.9 kg (132 lb)   11/02/18 57.7 kg (127 lb 3.3 oz)   11/02/18 57.6 kg (127 lb)   02/01/18 64 kg (141 lb 3.2 oz)     Constitutional: Alert, no distress.  Skin: Warm, dry, good turgor, no rashes or suspicious lesions in visible areas.  Eye: pupils equal, round and reactive to light, conjunctivae clear, lids normal.  ENMT: Lips without lesions, good dentition, oropharynx clear. Pinnae skin normal with no lesions. TM pearly gray with normal light reflex.   Neck: supple, no masses. No anterior or supraclavicular adenopathy. No carotid bruits no thyromegaly.  Respiratory: Unlabored respiratory effort, lungs clear to auscultation, no wheezes, no ronchi.  Cardiovascular: Normal S1, S2, no murmur, no peripheral edema.  Abdomen: no CVAT abdomen Soft, non-tender, no masses, no hepatosplenomegaly.  Psych: Alert and oriented x3, normal affect and mood.  Musc/Skel: 5/5 strength and normal motion UE and LE proximal and distal.        ASSESSMENT/PLAN:  1. Routine general medical examination at health care facility  - Counseled for a healthy diet and regular physical exercise.  Age-appropriate immunization and cancer screening.  - CBC WITH DIFFERENTIAL; Future  - Comp Metabolic Panel; Future  - Lipid Profile; Future  - TSH; Future  - FREE THYROXINE; Future    2. Bleeding hemorrhoid  - Patient is concerned for potential colon cancer, polyps, or tumor after having a bloody bowel movement. She has no previous or family history of colon cancer.   - After bowel movements, advised sitting in warm water (sitz bath) for 10-15 minutes and then try treating with Preparation H cream for external hemorrhoids.  - She can further try a Preparation H suppository to help for internal hemorrhoids.  Patient is advised to drink more water and eat more fiber to " prevent constipation. She can try Metamucil powder if she has constipation.  - She has slightly low potassium at 3.4 and previous blood test on 11/2/2018, but she has normal kidney function tests.  Patient is advised to eat potassium rich diet.   - Will refer to GI for further assessment and possible rubber banding.   - REFERRAL TO GASTROENTEROLOGY  - CBC WITH DIFFERENTIAL; Future  - Comp Metabolic Panel; Future    3. Health maintenance  - Patient had a normal pap smear in 2018. She is not due for repeat pap smear until 2021.  Her immunizations are up-to-date.      Labs ordered  Immunizations per orders  Pt counseled about skin care, diet, supplements, and exercise.    Next office visit for recheck of chronic medical conditions is due in  1 year.